# Patient Record
Sex: FEMALE | Race: WHITE | Employment: FULL TIME | URBAN - METROPOLITAN AREA
[De-identification: names, ages, dates, MRNs, and addresses within clinical notes are randomized per-mention and may not be internally consistent; named-entity substitution may affect disease eponyms.]

---

## 2019-09-26 ENCOUNTER — HOSPITAL ENCOUNTER (EMERGENCY)
Age: 24
Discharge: HOME | End: 2019-09-26
Attending: EMERGENCY MEDICINE
Payer: COMMERCIAL

## 2019-09-26 ENCOUNTER — APPOINTMENT (OUTPATIENT)
Dept: GENERAL RADIOLOGY | Age: 24
End: 2019-09-26
Attending: EMERGENCY MEDICINE
Payer: COMMERCIAL

## 2019-09-26 VITALS
HEART RATE: 76 BPM | RESPIRATION RATE: 16 BRPM | HEIGHT: 70 IN | WEIGHT: 134 LBS | OXYGEN SATURATION: 99 % | SYSTOLIC BLOOD PRESSURE: 123 MMHG | TEMPERATURE: 98.5 F | BODY MASS INDEX: 19.18 KG/M2 | DIASTOLIC BLOOD PRESSURE: 80 MMHG

## 2019-09-26 DIAGNOSIS — R51.9 HEADACHE, UNSPECIFIED HEADACHE TYPE: Primary | ICD-10-CM

## 2019-09-26 DIAGNOSIS — J45.20 MILD INTERMITTENT REACTIVE AIRWAY DISEASE WITHOUT COMPLICATION: ICD-10-CM

## 2019-09-26 PROCEDURE — 94640 AIRWAY INHALATION TREATMENT: CPT

## 2019-09-26 PROCEDURE — 74011000250 HC RX REV CODE- 250: Performed by: EMERGENCY MEDICINE

## 2019-09-26 PROCEDURE — 71046 X-RAY EXAM CHEST 2 VIEWS: CPT

## 2019-09-26 PROCEDURE — 99283 EMERGENCY DEPT VISIT LOW MDM: CPT | Performed by: EMERGENCY MEDICINE

## 2019-09-26 RX ORDER — ALBUTEROL SULFATE 90 UG/1
2 AEROSOL, METERED RESPIRATORY (INHALATION)
Qty: 1 INHALER | Refills: 4 | Status: SHIPPED | OUTPATIENT
Start: 2019-09-26

## 2019-09-26 RX ORDER — IPRATROPIUM BROMIDE AND ALBUTEROL SULFATE 2.5; .5 MG/3ML; MG/3ML
3 SOLUTION RESPIRATORY (INHALATION)
Status: COMPLETED | OUTPATIENT
Start: 2019-09-26 | End: 2019-09-26

## 2019-09-26 RX ADMIN — IPRATROPIUM BROMIDE AND ALBUTEROL SULFATE 3 ML: .5; 3 SOLUTION RESPIRATORY (INHALATION) at 18:16

## 2019-09-26 NOTE — ED PROVIDER NOTES
Sierra Brian,#[id number] is a 27 y.o. female seen on 9/26/2019 at 6:01 PM in the [facility] EMERGENCY DEPT in room HD/D. Chief Complaint   Patient presents with    Headache     HPI: 72-year-old female presenting to the emergency department for multiple complaints. Despite patient being able and attempting she states that the patient has to provide history, the mother provides the [de-identified] of the history. History of migraine headaches that occur at least twice a week. She has a headache currently. It is all over her head but she feels like it began in the back of her head. Patient also states she feels like she may have a sinus infection she is been very congested over the last couple of days. However the mom states that the reason she forced the patient to come into the emergency department today is because she has been complaining of wheezing and some shortness of breath and some discomfort in her chest and she has a history of asthma. She does not have any albuterol at home she does have Symbicort which she has been taking. She currently does not feel short of breath she has not had any chest pain the day. She has not had any fevers, she does not have a productive cough. She has had some nausea associated with her headaches, no numbness or tingling, no weakness in extremities, no syncope. The headache was gradual in onset. It feels similar to all of her previous headaches. She is taking Excedrin Migraine as well as a cold medicine for this. Historian: Patient, mother    REVIEW OF SYSTEMS     Review of Systems   Constitutional: Negative for fever. HENT: Positive for congestion. Eyes: Negative. Respiratory: Positive for wheezing. Negative for cough, chest tightness and shortness of breath. Gastrointestinal: Negative for abdominal distention, abdominal pain, constipation, diarrhea and vomiting. Endocrine: Negative.     Genitourinary: Negative for dysuria, flank pain, frequency and urgency. Neurological: Positive for headaches. Negative for dizziness and syncope. Psychiatric/Behavioral: Negative. All other systems reviewed and are negative. PAST MEDICAL HISTORY     Past Medical History:   Diagnosis Date    Neurological disorder     migraine     Past Surgical History:   Procedure Laterality Date    HX ADENOIDECTOMY      HX HEENT      sinus    HX ORTHOPAEDIC      knee surgeries     Social History     Tobacco Use    Smoking status: Never Smoker    Smokeless tobacco: Never Used   Substance and Sexual Activity    Alcohol use: Yes     Comment: occ    Drug use: Yes     Types: Marijuana     Comment: maybe once per month     None     Allergies   Allergen Reactions    Sulfa (Sulfonamide Antibiotics) Anaphylaxis        PHYSICAL EXAM       Vitals:    09/26/19 1649   BP: 122/81   Pulse: 88   Resp: 16   Temp: 98.8 °F (37.1 °C)   SpO2: 98%    Vital signs were reviewed. Physical Exam   Constitutional: She is oriented to person, place, and time. She appears well-developed and well-nourished. No distress. HENT:   Head: Normocephalic and atraumatic. Right Ear: External ear normal.   Left Ear: External ear normal.   Mouth/Throat: No oropharyngeal exudate. Eyes: Pupils are equal, round, and reactive to light. EOM are normal.   Neck: Normal range of motion. Neck supple. No meningismus   Cardiovascular: Normal rate, regular rhythm, normal heart sounds and intact distal pulses. Exam reveals no gallop and no friction rub. No murmur heard. Pulmonary/Chest: Effort normal and breath sounds normal. No stridor. No respiratory distress. She has no wheezes. She has no rales. diminished expiratory breath sounds   Abdominal: Soft. Bowel sounds are normal. She exhibits no distension and no mass. There is no tenderness. There is no rebound and no guarding. Musculoskeletal: Normal range of motion. She exhibits no edema, tenderness or deformity. Lymphadenopathy:     She has no cervical adenopathy. Neurological: She is alert and oriented to person, place, and time. She has normal strength. She displays no atrophy. No cranial nerve deficit or sensory deficit. She displays a negative Romberg sign. Coordination and gait normal. GCS eye subscore is 4. GCS verbal subscore is 5. GCS motor subscore is 6.   5+ strength in all distributions, sensation intact, finger-nose-finger intact, normal gait, normal tandem gait,   Skin: Skin is warm and dry. Capillary refill takes less than 2 seconds. No rash noted. She is not diaphoretic. No erythema. Psychiatric: She has a normal mood and affect. Her behavior is normal.   Vitals reviewed. MEDICAL DECISION MAKING       MDM  Number of Diagnoses or Management Options     Amount and/or Complexity of Data Reviewed  Tests in the radiology section of CPT®: ordered and reviewed    Risk of Complications, Morbidity, and/or Mortality  Presenting problems: high  Diagnostic procedures: high  Management options: high      Procedures    ED Course:  Patient presents to the ER with headache similar to previous episodes. Neurological evaluation is unremarkable and reassuring. Headache was gradual in onset. I have very low suspicion for SAH, dissection, or other concerning cause of headache and do not feel that neuroimaging is indicated at this time. Pt is refusing medication to treat her headache.    6:52 PM  Reevaluation after albuterol shows some improved aeration with expiratory breath sounds. There is no evidence of pneumonia. Will discharge home at this time with referral to neurology given her chronic headaches as well as a refill for albuterol. Disposition: disharge home  Diagnosis:, Unspecified, reactive airway disease  ____________________________________________________________________  A portion of this note was generated using voice recognition dictation software.  While the note has been reviewed for accuracy, please note certain words and phrases may not be transcribed as intended and some grammatical and/or typographical errors may be present.

## 2019-09-26 NOTE — ED NOTES
I have reviewed discharge instructions with the patient. The patient verbalized understanding. Patient left ED via Discharge Method: ambulatory to Home with mother. Opportunity for questions and clarification provided. Patient given 1 scripts. To continue your aftercare when you leave the hospital, you may receive an automated call from our care team to check in on how you are doing. This is a free service and part of our promise to provide the best care and service to meet your aftercare needs.  If you have questions, or wish to unsubscribe from this service please call [phone]. Thank you for Choosing our [facility] Emergency Department.

## 2019-09-26 NOTE — ED TRIAGE NOTES
Pt to triage complaining of headaches off and on for 2 years. States she saw her pediatrician a year or so ago and they put her on preventative medication but it wasn't really helpful. States she's had several falls where she has had contusions on her head. States when she gets the headaches her face swells. States she is also having sinus pressure.

## 2019-09-26 NOTE — DISCHARGE INSTRUCTIONS
Use the inhaler, 2 puffs, every 3-4 hours for the next 4 days, then use as needed. Our neurologist do not take referral from the ER. You will need to establish with a primary care doctor and get a referral to neurology through your primary care doctor.

## 2021-09-30 PROBLEM — L20.9 ATOPIC DERMATITIS: Status: ACTIVE | Noted: 2019-10-14

## 2021-09-30 PROBLEM — T78.2XXA ANAPHYLAXIS DUE TO EXERCISE: Status: ACTIVE | Noted: 2019-10-14
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
## 2021-09-30 PROBLEM — J45.20 INTERMITTENT ASTHMA WITHOUT COMPLICATION: Status: ACTIVE | Noted: 2018-02-21

## 2021-09-30 PROBLEM — J32.9 CHRONIC SINUSITIS: Status: ACTIVE | Noted: 2021-05-06

## 2021-09-30 PROBLEM — G43.109 MIGRAINE WITH AURA AND WITHOUT STATUS MIGRAINOSUS, NOT INTRACTABLE: Status: ACTIVE | Noted: 2020-04-29

## 2022-03-02 ENCOUNTER — ANESTHESIA (OUTPATIENT)
Dept: SURGERY | Age: 27
End: 2022-03-02
Payer: COMMERCIAL

## 2022-03-02 ENCOUNTER — ANESTHESIA EVENT (OUTPATIENT)
Dept: SURGERY | Age: 27
End: 2022-03-02
Payer: COMMERCIAL

## 2022-03-02 ENCOUNTER — HOSPITAL ENCOUNTER (OUTPATIENT)
Age: 27
Setting detail: OUTPATIENT SURGERY
Discharge: HOME | End: 2022-03-02
Attending: OBSTETRICS & GYNECOLOGY | Admitting: OBSTETRICS & GYNECOLOGY
Payer: COMMERCIAL

## 2022-03-02 VITALS
BODY MASS INDEX: 21.62 KG/M2 | SYSTOLIC BLOOD PRESSURE: 117 MMHG | WEIGHT: 146 LBS | TEMPERATURE: 99.5 F | HEIGHT: 69 IN | HEART RATE: 64 BPM | OXYGEN SATURATION: 99 % | DIASTOLIC BLOOD PRESSURE: 58 MMHG | RESPIRATION RATE: 16 BRPM

## 2022-03-02 DIAGNOSIS — N83.201 CYST OF RIGHT OVARY: Primary | ICD-10-CM

## 2022-03-02 LAB — HCG UR QL: NEGATIVE

## 2022-03-02 PROCEDURE — 58661 LAPAROSCOPY REMOVE ADNEXA: CPT | Performed by: OBSTETRICS & GYNECOLOGY

## 2022-03-02 PROCEDURE — 74011000258 HC RX REV CODE- 258: Performed by: ANESTHESIOLOGY

## 2022-03-02 PROCEDURE — 77030040361 HC SLV COMPR DVT MDII -B: Performed by: OBSTETRICS & GYNECOLOGY

## 2022-03-02 PROCEDURE — 74011250636 HC RX REV CODE- 250/636: Performed by: ANESTHESIOLOGY

## 2022-03-02 PROCEDURE — 77030018836 HC SOL IRR NACL ICUM -A: Performed by: OBSTETRICS & GYNECOLOGY

## 2022-03-02 PROCEDURE — 88305 TISSUE EXAM BY PATHOLOGIST: CPT

## 2022-03-02 PROCEDURE — 77030008756 HC TU IRR SUC STRY -B: Performed by: OBSTETRICS & GYNECOLOGY

## 2022-03-02 PROCEDURE — 77030039425 HC BLD LARYNG TRULITE DISP TELE -A: Performed by: ANESTHESIOLOGY

## 2022-03-02 PROCEDURE — 74011000250 HC RX REV CODE- 250: Performed by: ANESTHESIOLOGY

## 2022-03-02 PROCEDURE — 74011250636 HC RX REV CODE- 250/636: Performed by: NURSE ANESTHETIST, CERTIFIED REGISTERED

## 2022-03-02 PROCEDURE — 81025 URINE PREGNANCY TEST: CPT

## 2022-03-02 PROCEDURE — 77030031139 HC SUT VCRL2 J&J -A: Performed by: OBSTETRICS & GYNECOLOGY

## 2022-03-02 PROCEDURE — 77030037088 HC TUBE ENDOTRACH ORAL NSL COVD-A: Performed by: ANESTHESIOLOGY

## 2022-03-02 PROCEDURE — 76210000026 HC REC RM PH II 1 TO 1.5 HR: Performed by: OBSTETRICS & GYNECOLOGY

## 2022-03-02 PROCEDURE — 74011000250 HC RX REV CODE- 250: Performed by: OBSTETRICS & GYNECOLOGY

## 2022-03-02 PROCEDURE — 77030019908 HC STETH ESOPH SIMS -A: Performed by: ANESTHESIOLOGY

## 2022-03-02 PROCEDURE — 76210000006 HC OR PH I REC 0.5 TO 1 HR: Performed by: OBSTETRICS & GYNECOLOGY

## 2022-03-02 PROCEDURE — 77030041236 HC APPL SURG ENDO JNJ -B: Performed by: OBSTETRICS & GYNECOLOGY

## 2022-03-02 PROCEDURE — 2709999900 HC NON-CHARGEABLE SUPPLY: Performed by: OBSTETRICS & GYNECOLOGY

## 2022-03-02 PROCEDURE — 74011250637 HC RX REV CODE- 250/637: Performed by: ANESTHESIOLOGY

## 2022-03-02 PROCEDURE — 77030012770 HC TRCR OPT FX AMR -B: Performed by: OBSTETRICS & GYNECOLOGY

## 2022-03-02 PROCEDURE — 74011000250 HC RX REV CODE- 250: Performed by: NURSE ANESTHETIST, CERTIFIED REGISTERED

## 2022-03-02 PROCEDURE — 77030020829: Performed by: OBSTETRICS & GYNECOLOGY

## 2022-03-02 PROCEDURE — 77030040830 HC CATH URETH FOL MDII -A: Performed by: OBSTETRICS & GYNECOLOGY

## 2022-03-02 PROCEDURE — 77030003578 HC NDL INSUF VERES AMR -B: Performed by: OBSTETRICS & GYNECOLOGY

## 2022-03-02 PROCEDURE — 77030008522 HC TBNG INSUF LAPRO STRY -B: Performed by: OBSTETRICS & GYNECOLOGY

## 2022-03-02 PROCEDURE — 77030008606 HC TRCR ENDOSC KII AMR -B: Performed by: OBSTETRICS & GYNECOLOGY

## 2022-03-02 PROCEDURE — 77030040922 HC BLNKT HYPOTHRM STRY -A: Performed by: ANESTHESIOLOGY

## 2022-03-02 PROCEDURE — 76010000162 HC OR TIME 1.5 TO 2 HR INTENSV-TIER 1: Performed by: OBSTETRICS & GYNECOLOGY

## 2022-03-02 PROCEDURE — 77030039147 HC PWDR HEMSTS SURGICEL JNJ -D: Performed by: OBSTETRICS & GYNECOLOGY

## 2022-03-02 PROCEDURE — 77030010507 HC ADH SKN DERMBND J&J -B: Performed by: OBSTETRICS & GYNECOLOGY

## 2022-03-02 PROCEDURE — 76060000034 HC ANESTHESIA 1.5 TO 2 HR: Performed by: OBSTETRICS & GYNECOLOGY

## 2022-03-02 RX ORDER — OXYCODONE AND ACETAMINOPHEN 5; 325 MG/1; MG/1
1 TABLET ORAL
Qty: 12 TABLET | Refills: 0 | Status: SHIPPED | OUTPATIENT
Start: 2022-03-02 | End: 2022-03-05

## 2022-03-02 RX ORDER — GLYCOPYRROLATE 0.2 MG/ML
INJECTION INTRAMUSCULAR; INTRAVENOUS AS NEEDED
Status: DISCONTINUED | OUTPATIENT
Start: 2022-03-02 | End: 2022-03-02 | Stop reason: HOSPADM

## 2022-03-02 RX ORDER — LIDOCAINE HYDROCHLORIDE 20 MG/ML
INJECTION, SOLUTION EPIDURAL; INFILTRATION; INTRACAUDAL; PERINEURAL AS NEEDED
Status: DISCONTINUED | OUTPATIENT
Start: 2022-03-02 | End: 2022-03-02 | Stop reason: HOSPADM

## 2022-03-02 RX ORDER — SODIUM CHLORIDE 0.9 % (FLUSH) 0.9 %
5-40 SYRINGE (ML) INJECTION AS NEEDED
Status: DISCONTINUED | OUTPATIENT
Start: 2022-03-02 | End: 2022-03-02 | Stop reason: HOSPADM

## 2022-03-02 RX ORDER — ROCURONIUM BROMIDE 10 MG/ML
INJECTION, SOLUTION INTRAVENOUS AS NEEDED
Status: DISCONTINUED | OUTPATIENT
Start: 2022-03-02 | End: 2022-03-02 | Stop reason: HOSPADM

## 2022-03-02 RX ORDER — MIDAZOLAM HYDROCHLORIDE 1 MG/ML
2 INJECTION, SOLUTION INTRAMUSCULAR; INTRAVENOUS
Status: COMPLETED | OUTPATIENT
Start: 2022-03-02 | End: 2022-03-02

## 2022-03-02 RX ORDER — OXYCODONE AND ACETAMINOPHEN 5; 325 MG/1; MG/1
1 TABLET ORAL AS NEEDED
Status: DISCONTINUED | OUTPATIENT
Start: 2022-03-02 | End: 2022-03-02 | Stop reason: HOSPADM

## 2022-03-02 RX ORDER — NALOXONE HYDROCHLORIDE 0.4 MG/ML
0.2 INJECTION, SOLUTION INTRAMUSCULAR; INTRAVENOUS; SUBCUTANEOUS AS NEEDED
Status: DISCONTINUED | OUTPATIENT
Start: 2022-03-02 | End: 2022-03-02 | Stop reason: HOSPADM

## 2022-03-02 RX ORDER — SODIUM CHLORIDE, SODIUM LACTATE, POTASSIUM CHLORIDE, CALCIUM CHLORIDE 600; 310; 30; 20 MG/100ML; MG/100ML; MG/100ML; MG/100ML
75 INJECTION, SOLUTION INTRAVENOUS CONTINUOUS
Status: DISCONTINUED | OUTPATIENT
Start: 2022-03-02 | End: 2022-03-02 | Stop reason: HOSPADM

## 2022-03-02 RX ORDER — HYDROMORPHONE HYDROCHLORIDE 2 MG/ML
0.5 INJECTION, SOLUTION INTRAMUSCULAR; INTRAVENOUS; SUBCUTANEOUS
Status: DISCONTINUED | OUTPATIENT
Start: 2022-03-02 | End: 2022-03-02 | Stop reason: HOSPADM

## 2022-03-02 RX ORDER — BUPIVACAINE HYDROCHLORIDE 5 MG/ML
INJECTION, SOLUTION EPIDURAL; INTRACAUDAL AS NEEDED
Status: DISCONTINUED | OUTPATIENT
Start: 2022-03-02 | End: 2022-03-02 | Stop reason: HOSPADM

## 2022-03-02 RX ORDER — ONDANSETRON 2 MG/ML
INJECTION INTRAMUSCULAR; INTRAVENOUS AS NEEDED
Status: DISCONTINUED | OUTPATIENT
Start: 2022-03-02 | End: 2022-03-02 | Stop reason: HOSPADM

## 2022-03-02 RX ORDER — PROMETHAZINE HYDROCHLORIDE 25 MG/1
25 TABLET ORAL
Qty: 15 TABLET | Refills: 0 | Status: SHIPPED | OUTPATIENT
Start: 2022-03-02

## 2022-03-02 RX ORDER — PROPOFOL 10 MG/ML
INJECTION, EMULSION INTRAVENOUS AS NEEDED
Status: DISCONTINUED | OUTPATIENT
Start: 2022-03-02 | End: 2022-03-02 | Stop reason: HOSPADM

## 2022-03-02 RX ORDER — LIDOCAINE HYDROCHLORIDE 10 MG/ML
0.1 INJECTION INFILTRATION; PERINEURAL AS NEEDED
Status: DISCONTINUED | OUTPATIENT
Start: 2022-03-02 | End: 2022-03-02 | Stop reason: HOSPADM

## 2022-03-02 RX ORDER — DEXAMETHASONE SODIUM PHOSPHATE 4 MG/ML
INJECTION, SOLUTION INTRA-ARTICULAR; INTRALESIONAL; INTRAMUSCULAR; INTRAVENOUS; SOFT TISSUE AS NEEDED
Status: DISCONTINUED | OUTPATIENT
Start: 2022-03-02 | End: 2022-03-02 | Stop reason: HOSPADM

## 2022-03-02 RX ORDER — NEOSTIGMINE METHYLSULFATE 1 MG/ML
INJECTION, SOLUTION INTRAVENOUS AS NEEDED
Status: DISCONTINUED | OUTPATIENT
Start: 2022-03-02 | End: 2022-03-02 | Stop reason: HOSPADM

## 2022-03-02 RX ORDER — ONDANSETRON 2 MG/ML
4 INJECTION INTRAMUSCULAR; INTRAVENOUS ONCE
Status: COMPLETED | OUTPATIENT
Start: 2022-03-02 | End: 2022-03-02

## 2022-03-02 RX ORDER — SODIUM CHLORIDE 0.9 % (FLUSH) 0.9 %
5-40 SYRINGE (ML) INJECTION EVERY 8 HOURS
Status: DISCONTINUED | OUTPATIENT
Start: 2022-03-02 | End: 2022-03-02 | Stop reason: HOSPADM

## 2022-03-02 RX ORDER — SCOLOPAMINE TRANSDERMAL SYSTEM 1 MG/1
1 PATCH, EXTENDED RELEASE TRANSDERMAL ONCE
Status: DISCONTINUED | OUTPATIENT
Start: 2022-03-02 | End: 2022-03-02 | Stop reason: HOSPADM

## 2022-03-02 RX ORDER — APREPITANT 40 MG/1
40 CAPSULE ORAL ONCE
Status: COMPLETED | OUTPATIENT
Start: 2022-03-02 | End: 2022-03-02

## 2022-03-02 RX ORDER — FENTANYL CITRATE 50 UG/ML
INJECTION, SOLUTION INTRAMUSCULAR; INTRAVENOUS AS NEEDED
Status: DISCONTINUED | OUTPATIENT
Start: 2022-03-02 | End: 2022-03-02 | Stop reason: HOSPADM

## 2022-03-02 RX ORDER — ONDANSETRON 2 MG/ML
INJECTION INTRAMUSCULAR; INTRAVENOUS
Status: DISCONTINUED
Start: 2022-03-02 | End: 2022-03-02 | Stop reason: HOSPADM

## 2022-03-02 RX ORDER — CEFAZOLIN SODIUM/WATER 2 G/20 ML
SYRINGE (ML) INTRAVENOUS AS NEEDED
Status: DISCONTINUED | OUTPATIENT
Start: 2022-03-02 | End: 2022-03-02 | Stop reason: HOSPADM

## 2022-03-02 RX ORDER — HYDROMORPHONE HYDROCHLORIDE 2 MG/ML
INJECTION, SOLUTION INTRAMUSCULAR; INTRAVENOUS; SUBCUTANEOUS AS NEEDED
Status: DISCONTINUED | OUTPATIENT
Start: 2022-03-02 | End: 2022-03-02 | Stop reason: HOSPADM

## 2022-03-02 RX ADMIN — LIDOCAINE HYDROCHLORIDE 0.1 ML: 10 INJECTION, SOLUTION INFILTRATION; PERINEURAL at 14:00

## 2022-03-02 RX ADMIN — GLYCOPYRROLATE 0.4 MG: 0.2 INJECTION, SOLUTION INTRAMUSCULAR; INTRAVENOUS at 15:46

## 2022-03-02 RX ADMIN — PROMETHAZINE HYDROCHLORIDE 6.25 MG: 25 INJECTION INTRAMUSCULAR; INTRAVENOUS at 16:35

## 2022-03-02 RX ADMIN — FENTANYL CITRATE 100 MCG: 50 INJECTION INTRAMUSCULAR; INTRAVENOUS at 14:17

## 2022-03-02 RX ADMIN — LIDOCAINE HYDROCHLORIDE 80 MG: 20 INJECTION, SOLUTION EPIDURAL; INFILTRATION; INTRACAUDAL; PERINEURAL at 14:17

## 2022-03-02 RX ADMIN — HYDROMORPHONE HYDROCHLORIDE 0.4 MG: 2 INJECTION INTRAMUSCULAR; INTRAVENOUS; SUBCUTANEOUS at 15:17

## 2022-03-02 RX ADMIN — HYDROMORPHONE HYDROCHLORIDE 0.4 MG: 2 INJECTION INTRAMUSCULAR; INTRAVENOUS; SUBCUTANEOUS at 14:55

## 2022-03-02 RX ADMIN — PROPOFOL 200 MG: 10 INJECTION, EMULSION INTRAVENOUS at 14:17

## 2022-03-02 RX ADMIN — ROCURONIUM BROMIDE 40 MG: 50 INJECTION, SOLUTION INTRAVENOUS at 14:17

## 2022-03-02 RX ADMIN — OXYCODONE HYDROCHLORIDE AND ACETAMINOPHEN 1 TABLET: 5; 325 TABLET ORAL at 16:48

## 2022-03-02 RX ADMIN — MIDAZOLAM 2 MG: 1 INJECTION INTRAMUSCULAR; INTRAVENOUS at 14:05

## 2022-03-02 RX ADMIN — APREPITANT 40 MG: 40 CAPSULE ORAL at 14:04

## 2022-03-02 RX ADMIN — SODIUM CHLORIDE, SODIUM LACTATE, POTASSIUM CHLORIDE, AND CALCIUM CHLORIDE 75 ML/HR: 600; 310; 30; 20 INJECTION, SOLUTION INTRAVENOUS at 14:00

## 2022-03-02 RX ADMIN — DEXAMETHASONE SODIUM PHOSPHATE 10 MG: 4 INJECTION, SOLUTION INTRAMUSCULAR; INTRAVENOUS at 14:28

## 2022-03-02 RX ADMIN — Medication 3 MG: at 15:46

## 2022-03-02 RX ADMIN — Medication 2 G: at 14:42

## 2022-03-02 RX ADMIN — ONDANSETRON 4 MG: 2 INJECTION INTRAMUSCULAR; INTRAVENOUS at 14:28

## 2022-03-02 RX ADMIN — SODIUM CHLORIDE, SODIUM LACTATE, POTASSIUM CHLORIDE, AND CALCIUM CHLORIDE: 600; 310; 30; 20 INJECTION, SOLUTION INTRAVENOUS at 15:49

## 2022-03-02 RX ADMIN — ONDANSETRON 4 MG: 2 INJECTION INTRAMUSCULAR; INTRAVENOUS at 16:17

## 2022-03-02 NOTE — PERIOP NOTES
[address] March 2, 2022       RE: Abisai [address]ins      To Whom It May Concern,    This is to certify that [address] may may return to work on March 9th, 2022. Please feel free to contact my office if you have any questions or concerns. Thank you for your assistance in this matter.       Sincerely,    Dr. Chalo Noel

## 2022-03-02 NOTE — H&P
Subjective:     [address], MRN: [id number], is a 27 y.o.  female presents with pelvic pain since last night, seen by Dr Dhruv Stover this morning, U/S confirming a ruptured right ovarian hemorrhagic cyst. Patient states pain is excruciating and wishes to have surgery for drainage of cyst/blood. . gradually worsening course. See office notes on  Care.  She is NPO since last night    Patient Active Problem List    Diagnosis Date Noted    Chronic sinusitis 05/06/2021    Migraine with aura and without status migrainosus, not intractable 04/29/2020    Atopic dermatitis 10/14/2019    Anaphylaxis due to exercise 10/14/2019    Intermittent asthma without complication 28/84/8329     Past Medical History:   Diagnosis Date    Allergy     DVT of axillary vein, acute left ([address]     2014-after knee sugery    Neurological disorder     migraine      Past Surgical History:   Procedure Laterality Date    HX ADENOIDECTOMY      HX HEENT      sinus    HX ORTHOPAEDIC      knee surgeries      [unfilled]  Allergies   Allergen Reactions    Acetazolamide Rash    Cherry Anaphylaxis     Cherry     Sulfa (Sulfonamide Antibiotics) Anaphylaxis    Nickel Rash      Social History     Tobacco Use    Smoking status: Never Smoker    Smokeless tobacco: Never Used   Substance Use Topics    Alcohol use: Yes     Comment: occ      Family History   Problem Relation Age of Onset   Pedrito Ramos Arthritis-rheumatoid Mother     Breast Cancer Neg Hx     Colon Cancer Neg Hx         Review of Systems  Constitutional: negative  Respiratory: negative  Cardiovascular: negative  Musculoskeletal:negative    Objective:     Patient Vitals for the past 8 hrs:   BP Temp Pulse Resp SpO2 Height Weight   03/02/22 1331 130/81 98.1 °F (36.7 °C) 68 16 98 % 5' 8.5\" (1.74 m) 146 lb (66.2 kg)     No intake or output data in the 24 hours ending 03/02/22 1358  Visit Vitals  /81 (BP 1 Location: Right upper arm, BP Patient Position: At rest;Sitting)   Pulse 68 Temp 98.1 °F (36.7 °C)   Resp 16   Ht 5' 8.5\" (1.74 m)   Wt 146 lb (66.2 kg)   SpO2 98%   BMI 21.88 kg/m²     General appearance: alert, cooperative, no distress, appears stated age  Head: Normocephalic, without obvious abnormality, atraumatic  Back: symmetric, no curvature. ROM normal. No CVA tenderness. Lungs: clear to auscultation bilaterally  Heart: regular rate and rhythm, S1, S2 normal, no murmur, click, rub or gallop  Abdomen: soft, but tender. Bowel sounds normal. No masses,  no organomegaly  Pelvic: defferred  Extremities: extremities normal, atraumatic, no cyanosis or edema  Pulses: 2+ and symmetric  Skin: Skin color, texture, turgor normal. No rashes or lesions      Assessment:         Ruptured right ovarian hemorrhagic cyst causing unbearable pain. Patient wishes to have laparoscopic drainage asap. Discussed risks of infection, DVT, damage to bowel/bladder/other internal organs, bleeding/transfusion, scar tissue/adhesions. Also possible removal of right ovary if bleeding cannot be stopped. All questions answered, will proceed.       Plan:     Diagnostic laparoscopy with drainage of hemoperitoneum and right ovarian cyst under general anesthesia

## 2022-03-02 NOTE — ANESTHESIA POSTPROCEDURE EVALUATION
Procedure(s):  RIGHT OVARIAN CYSTECTOMY.     general    Anesthesia Post Evaluation      Multimodal analgesia: multimodal analgesia used between 6 hours prior to anesthesia start to PACU discharge  Patient location during evaluation: bedside  Patient participation: complete - patient participated  Level of consciousness: responsive to verbal stimuli  Pain management: adequate  Airway patency: patent  Anesthetic complications: no  Cardiovascular status: hemodynamically stable  Respiratory status: spontaneous ventilation  Hydration status: stable    Final Post Anesthesia Temperature Assessment:  Normothermia (36.0-37.5 degrees C)      INITIAL Post-op Vital signs:   Vitals Value Taken Time   /55 03/02/22 1610   Temp 37.5 °C (99.5 °F) 03/02/22 1606   Pulse 61 03/02/22 1610   Resp 16 03/02/22 1610   SpO2 100 % 03/02/22 1610

## 2022-03-02 NOTE — PERIOP NOTES
Pt dc'd in stable condition via wheelchair to home with family. All discharge instructions discussed with patient and family; verbalized understanding. Pt and family notified of pain meds/next time to be taken. No percocet prior to 2100 tonight.

## 2022-03-02 NOTE — ANESTHESIA PREPROCEDURE EVALUATION
Relevant Problems   RESPIRATORY SYSTEM   (+) Intermittent asthma without complication      NEUROLOGY   (+) Migraine with aura and without status migrainosus, not intractable       Anesthetic History   No history of anesthetic complications            Review of Systems / Medical History  Patient summary reviewed and pertinent labs reviewed    Pulmonary            Asthma : well controlled       Neuro/Psych   Within defined limits           Cardiovascular                  Exercise tolerance: >4 METS     GI/Hepatic/Renal  Within defined limits              Endo/Other  Within defined limits           Other Findings              Physical Exam    Airway  Mallampati: I  TM Distance: 4 - 6 cm  Neck ROM: normal range of motion   Mouth opening: Normal     Cardiovascular    Rhythm: regular  Rate: normal         Dental  No notable dental hx       Pulmonary  Breath sounds clear to auscultation               Abdominal  GI exam deferred       Other Findings            Anesthetic Plan    ASA: 2, emergent  Anesthesia type: general          Induction: Intravenous  Anesthetic plan and risks discussed with: Patient

## 2022-03-02 NOTE — PERIOP NOTES
[address] [facility] Phone Call (performed day before scheduled surgery):    1. Have you have any exposure to anyone who has tested positive for COVID19 in the last 7 days? Patient Response: no      2.    Have you experienced any of the below symptoms in the last 48 hours?      -New onset respiratory symptoms                  -Fever or chills                  -Cough / Congestion / running nose                  -Shortness of breath or difficulty breathing                  -Headache                 -Sore Throat                 -New loss or taste or smell                 -Nausea / Vomiting / Diarrhea           Patient Response: no    Comments:

## 2022-03-02 NOTE — BRIEF OP NOTE
Brief Postoperative Note    Patient: [address]  YOB: 1995  MRN: [id number]    Date of Procedure: 3/2/2022     Pre-Op Diagnosis: RUPTURED Right OVARIAN CYST    Post-Op Diagnosis: Same as preoperative diagnosis.  RIGHT      Procedure(s):  RIGHT OVARIAN CYSTECTOMY    Surgeon(s):  Ibrahima Saldivar MD    Surgical Assistant: None    Anesthesia: General     Estimated Blood Loss (mL): less than 50     Complications: Bleeding    Specimens:   ID Type Source Tests Collected by Time Destination   1 : Right Ovarian Cyst Wall Preservative   Ibrahima Saldivar MD 3/2/2022 1500 Pathology        Implants: * No implants in log *    Drains: * No LDAs found *    Findings: large right ruptured ovarian cyst with removal of cyst wall; normal uterus, right tube and left tube/ovary    Electronically Signed by Jasper Taylor MD on 3/2/2022 at 5:45 PM

## 2022-03-02 NOTE — DISCHARGE INSTRUCTIONS
No Percocet prior to 10pm tonight! May return to work on March 9th, 2022        INSTRUCTIONS FOLLOWING GYN LAPAROSCOPY      ACTIVITY   Limit activity today; increase activity tomorrow, but no vigorous exercise   Shower only; no tub baths   No douches, tampons or intercourse until your doctor releases you (at least 2 weeks)   May return to work or school as directed by your doctor    DIET   Clear liquids until no nausea or vomiting   Advance to regular diet as tolerated    PAIN   Expect a moderate amount of pain.  Take pain medication as directed by your doctor. If no prescription for pain is sent home with you, take the appropriate dose of your commonly used pain medication.  Call you doctor if pain is NOT relieved by medication.  DO NOT take aspirin or blood thinners until directed by your doctor. DRESSING CARE *** Does Not Apply   Change dressing / band aids as directed by your doctor. FOLLOW PHONE [address] will be made by nursing staff.  If you have any problems or concerns, call your doctor as needed. CALL YOUR DOCTOR IF   Excessive bleeding that does not stop after holding mild pressure over the area for 15 minutes   You soak a pad in an hour   Temperature of 101°F or above   Green or yellow, smelly drainage or discharge   You are unable to urinate by bedtime   Nausea and vomiting that does not stop by bedtime    MEDICATION INTERACTION:  During your procedure you potentially received a medication or medications which may reduce the effectiveness of oral contraceptives. Please consider other forms of contraception for 1 month following your procedure if you are currently using oral contraceptives as your primary form of birth control.  In addition to this, we recommend continuing your oral contraceptive as prescribed, unless otherwise instructed by your physician, during this time    After general anesthesia or intravenous sedation, for 24 hours or while taking prescription Narcotics:  · Limit your activities  · A responsible adult needs to be with you for the next 24 hours  · Do not drive and operate hazardous machinery  · Do not make important personal or business decisions  · Do not drink alcoholic beverages  · If you have not urinated within 8 hours after discharge, and you are experiencing discomfort from urinary retention, please go to the nearest ED. · If you have sleep apnea and have a CPAP machine, please use it for all naps and sleeping. · Please use caution when taking narcotics and any of your home medications that may cause drowsiness. *  Please give a list of your current medications to your Primary Care Provider. *  Please update this list whenever your medications are discontinued, doses are      changed, or new medications (including over-the-counter products) are added. *  Please carry medication information at all times in case of emergency situations. These are general instructions for a healthy lifestyle:  No smoking/ No tobacco products/ Avoid exposure to second hand smoke  Surgeon General's Warning:  Quitting smoking now greatly reduces serious risk to your health. Obesity, smoking, and sedentary lifestyle greatly increases your risk for illness  A healthy diet, regular physical exercise & weight monitoring are important for maintaining a healthy lifestyle    You may be retaining fluid if you have a history of heart failure or if you experience any of the following symptoms:  Weight gain of 3 pounds or more overnight or 5 pounds in a week, increased swelling in our hands or feet or shortness of breath while lying flat in bed. Please call your doctor as soon as you notice any of these symptoms; do not wait until your next office visit.

## 2022-03-09 NOTE — OP NOTES
[id number] [address]  OPERATIVE REPORT    Name:  Quan Salmon  MR#:  [id number]  :  1995  ACCOUNT #:  [de-identified]  DATE OF SERVICE:  2022    PREOPERATIVE DIAGNOSIS:  Acute rupture of a large right ovarian hemorrhagic cyst.    POSTOPERATIVE DIAGNOSIS:  Acute rupture of a large right ovarian hemorrhagic cyst.    PROCEDURE PERFORMED:  1. Diagnostic laparoscopy. 2.  Right ovarian cystectomy. 3.  Evacuation of pelvic hematoma. SURGEON:  Boone Carter MD    ASSISTANT:  none    ANESTHESIA:  General.    COMPLICATIONS:  none. SPECIMENS REMOVED:  Right ovarian cyst wall. IMPLANTS:  none. ESTIMATED BLOOD LOSS:  Less than 20 mL from surgery (approximately 200 to 250 mL of pre-existing blood loss, prior to surgery). DRAINS:  In-and-out catheterization. FINDINGS:  The patient had a very large ruptured right ovarian cyst with pre-existing blood and blood also still present in the cyst.  The right fallopian tube was normal as well as the uterus. Left ovary and left fallopian tube, the remainder of the pelvis and upper abdomen were normal.    PROCEDURE:  After informed consent, the patient was taken to the operating room, she was prepped and draped in the usual sterile fashion in the dorsal lithotomy position. I did not place anything in the uterus due to presence of IUD. The time-out was accomplished, and then laparoscopy was performed by infiltrating dilute Marcaine into the infraumbilical area. A #11 blade was used to make a 5 mm incision; through this, the Veress needle was passed into abdominal cavity and 3 liters of CO2 was infiltrated. A 5 mm disposable trocar was passed through this incision without difficulty, this went into the abdomen and pelvis, and the patient was placed in Trendelenburg. The laparoscope was now placed through the sleeve. We could easily see a lot of blood in the pelvis.   I continued on placing a 5 mm port in the right lower quadrant and a 5 mm port in the left lower quadrant under direct visualization. Through these two lateral ports, suction device as well as grasping device were passed back and forth. The blood was now evacuated out of the pelvis so that I could see the anatomy. The ovary was now grasped and pulled away from the uterus and the sidewall. Again, right fallopian tube was normal but there was an obvious left ovarian hemorrhagic cyst which was still bleeding somewhat. The two grasping devices were now placed along the opening, further opening the ovary to irrigate and then suck out the blood to see the cyst wall itself. The cyst wall was now tediously pulled away from the ovarian tissue proper. This was done in a piecemeal fashion but all of the tissue was removed, the cyst wall was later submitted for pathologic study. This did help the bleeding somewhat except in the inferior aspect of the laceration, there was still some bleeding. This was irrigated, so that we could find the actual focus of bleeding. This took a few minutes to be able to irrigate this properly and get a [place] device which could cauterize. This was now cauterized at the inferior aspect of the laceration, trying not to disturb normal ovary tissue. This was cauterized and irrigated again, and there was no further bleeding from the ovary and all the cyst wall had been removed. There was, however, a wall base that was present, so we then placed Surgicel powder on this area. Once the observation time went by, I then copiously irrigated this area and removed all the blood from the upper abdomen and pelvis as well. Final inspection revealed good hemostasis, there was normal bleeding and pictures were taken at the end of the case. The case was now terminated as the CO2 was allowed to escape and all of the ports were removed under direct visualization. The ports were now closed with 4-0 Vicryl subcuticular and then Dermabond over this.   The counts were correct x2, the patient was awakened and taken to the recovery room in stable condition. She was given pain pills and precautions on discharge.       Jamaal Fernandes MD      [id number]  D:  03/08/2022 17:08  T:  03/09/2022 4:08  JOB #:  [id number]

## 2022-03-18 PROBLEM — L20.9 ATOPIC DERMATITIS: Status: ACTIVE | Noted: 2019-10-14

## 2022-03-19 PROBLEM — T78.2XXA ANAPHYLAXIS DUE TO EXERCISE: Status: ACTIVE | Noted: 2019-10-14

## 2022-03-19 PROBLEM — J45.20 INTERMITTENT ASTHMA WITHOUT COMPLICATION: Status: ACTIVE | Noted: 2018-02-21

## 2022-03-19 PROBLEM — G43.109 MIGRAINE WITH AURA AND WITHOUT STATUS MIGRAINOSUS, NOT INTRACTABLE: Status: ACTIVE | Noted: 2020-04-29

## 2022-03-19 PROBLEM — J32.9 CHRONIC SINUSITIS: Status: ACTIVE | Noted: 2021-05-06
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
## 2022-06-09 ENCOUNTER — OFFICE VISIT (OUTPATIENT)
Dept: GYNECOLOGY | Age: 27
End: 2022-06-09
Payer: COMMERCIAL

## 2022-06-09 VITALS — SYSTOLIC BLOOD PRESSURE: 108 MMHG | DIASTOLIC BLOOD PRESSURE: 78 MMHG

## 2022-06-09 DIAGNOSIS — N93.9 ABNORMAL UTERINE BLEEDING (AUB): Primary | ICD-10-CM

## 2022-06-09 DIAGNOSIS — B37.9 YEAST INFECTION: ICD-10-CM

## 2022-06-09 DIAGNOSIS — N89.8 VAGINAL IRRITATION: ICD-10-CM

## 2022-06-09 LAB — WET PREP (POC): ABNORMAL

## 2022-06-09 PROCEDURE — 99214 OFFICE O/P EST MOD 30 MIN: CPT | Performed by: OBSTETRICS & GYNECOLOGY

## 2022-06-09 PROCEDURE — 76830 TRANSVAGINAL US NON-OB: CPT | Performed by: OBSTETRICS & GYNECOLOGY

## 2022-06-09 PROCEDURE — 87210 SMEAR WET MOUNT SALINE/INK: CPT | Performed by: OBSTETRICS & GYNECOLOGY

## 2022-06-09 RX ORDER — FLUCONAZOLE 150 MG/1
TABLET ORAL
Qty: 2 TABLET | Refills: 1 | Status: SHIPPED | OUTPATIENT
Start: 2022-06-09 | End: 2022-10-19 | Stop reason: SDUPTHER

## 2022-06-09 RX ORDER — MOMETASONE FUROATE 50 UG/1
SPRAY, METERED NASAL
COMMUNITY
Start: 2022-05-29

## 2022-06-09 RX ORDER — PREDNISONE 20 MG/1
TABLET ORAL
COMMUNITY
Start: 2022-06-07

## 2022-06-09 RX ORDER — AMOXICILLIN AND CLAVULANATE POTASSIUM 875; 125 MG/1; MG/1
TABLET, FILM COATED ORAL
COMMUNITY
Start: 2022-06-07

## 2022-06-09 RX ORDER — OMEPRAZOLE 40 MG/1
CAPSULE, DELAYED RELEASE ORAL
COMMUNITY
Start: 2022-06-01

## 2022-06-09 RX ORDER — BUDESONIDE 0.5 MG/2ML
INHALANT ORAL
COMMUNITY
Start: 2021-07-22

## 2022-06-09 NOTE — PROGRESS NOTES
;HPI  Chidi Ammy is a 27 y.o. female seen for AUB and vaginal discharge with itching and burning. She has an IUD and has not had a period since the IUD was placed until several days ago she had bright red bleeding that has now turned into dark blood. She has discharge with itching and burning. She has been on several different abs and steroids for a sinus infection. Past Medical History, Past Surgical History, Family history, Social History, and Medications were all reviewed with the patient today and updated as necessary. Current Outpatient Medications   Medication Sig    amoxicillin-clavulanate (AUGMENTIN) 875-125 MG per tablet     predniSONE (DELTASONE) 20 MG tablet 2 tabs daily    omeprazole (PRILOSEC) 40 MG delayed release capsule     mometasone (NASONEX) 50 MCG/ACT nasal spray SPRAY 2 SPRAYS INTO EACH NOSTRIL EVERY DAY    budesonide (PULMICORT) 0.5 MG/2ML nebulizer suspension Indications: ok to substitue. to include rinse bottle. Add 10mL of medication to 240mL of saline. Irrigate 2x daily. Budesonide 0.6mg/10 mL    fluconazole (DIFLUCAN) 150 MG tablet 1 tablet now and repeat dose in 4 days    nystatin-triamcinolone (MYCOLOG II) 675975-1.1 UNIT/GM-% cream Apply topically 3 times daily.  terconazole (TERAZOL 7) 0.4 % vaginal cream Place vaginally nightly.  albuterol sulfate  (90 Base) MCG/ACT inhaler Inhale 2 puffs into the lungs every 4 hours as needed    EPINEPHrine (EPIPEN) 0.3 MG/0.3ML SOAJ injection INJECT INTO OUTER THIGH FOR SEVERE ALLERGIC REACTION. CALL 911 AFTER USE.  fexofenadine (ALLEGRA) 180 MG tablet Take 180 mg by mouth daily    fluconazole (DIFLUCAN) 150 MG tablet Take 1 initially and repeat in 4 days. (Patient not taking: Reported on 6/9/2022)     No current facility-administered medications for this visit.      Allergies   Allergen Reactions    Acetazolamide Rash    Cherry Anaphylaxis     Cherry     Sulfa Antibiotics Anaphylaxis    Nickel Rash Past Medical History:   Diagnosis Date    Allergy     DVT of axillary vein, acute left ([address]     2014-after knee sugery    Neurological disorder     migraine    URI (upper respiratory infection)      Past Surgical History:   Procedure Laterality Date    ADENOIDECTOMY      GYN  2022    Diagnostic laparoscopy.; RIGHT OVARIAN CYST WALL\":     HEENT      sinus    ORTHOPEDIC SURGERY      knee surgeries     Family History   Problem Relation Age of Onset    Colon Cancer Neg Hx     Breast Cancer Neg Hx     Rheum Arthritis Mother       Social History     Tobacco Use    Smoking status: Never Smoker    Smokeless tobacco: Never Used   Substance Use Topics    Alcohol use: Yes     Comment: occ       Social History     Substance and Sexual Activity   Sexual Activity Yes    Birth control/protection: I.U.D. Comment: 10/2021 Luca     OB History    Para Term  AB Living   2 0 0 0 2 0   SAB IAB Ectopic Molar Multiple Live Births   0 0 0 0 0 0       Health Maintenance  Mammogram:   Colonoscopy:   Bone Density:  Pap Smear 10-8-21    ROS:    Review of Systems  General: Not Present- Chills, Fever, Fatigue, Insomnia, Hot flashes/Night sweats, Weight gain  Skin: Not Present- Bruising, Change in Wart/Mole, Excessive Sweating, Itching, Nail Changes, New Lesions, Rash, Skin Color Changes and Ulcer. HEENT: Not Present- Headache, Blurred Vision, Double Vision, Glaucoma, Visual Disturbances, Hearing Loss, Ringing in the Ears, Vertigo, Nose Bleed, Bleeding Gums, Hoarseness and Sore Throat. Neck: Not Present- Neck Pain and Neck Swelling. Respiratory: Not Present- Cough, Difficulty Breathing and Difficulty Breathing on Exertion. Breast: Not Present- Breast Mass, Breast Pain, Breast Swelling, Nipple Discharge, Nipple Pain, Recent Breast Size Changes and Skin Changes.   Cardiovascular: Not Present- Abnormal Blood Pressure, Chest Pain, Edema, Fainting / Blacking Out, Palpitations, Shortness of Breath and Swelling of Extremities. Gastrointestinal: Not Present- Abdominal Pain, Abdominal Swelling, Bloating, Change in Bowel Habits, Constipation, Diarrhea, Difficulty Swallowing, Gets full quickly at meals, Nausea, Rectal Bleeding and Vomiting. Female Genitourinary: Not Present- Dysmenorrhea, Dyspareunia, Decreased libido, Excessive Menstrual Bleeding, Menstrual Irregularities, Pelvic Pain, Urinary Complaints, Vaginal Discharge, Vaginal itching/burning, Vaginal odor  Musculoskeletal: Not Present- Joint Pain and Muscle Pain. Neurological: Not Present- Dizziness, Fainting, Headaches and Seizures. Psychiatric: Not Present- Anxiety, Depression, Mood changes and Panic Attacks. Endocrine: Not Present- Appetite Changes, Cold Intolerance, Excessive Thirst, Excessive Urination and Heat Intolerance. Hematology: Not Present- Abnormal Bleeding, Easy Bruising and Enlarged Lymph Nodes. PHYSICAL EXAM:    /78     Physical Exam   General   Mental Status - Alert. General Appearance - Cooperative. Abdomen   Inspection: - Inspection Normal.   Palpation/Percussion: Palpation and Percussion of the abdomen reveal - Non Tender, No Rebound tenderness, No Rigidity (guarding), No hepatosplenomegaly, No Palpable abdominal masses and Soft. Auscultation: Auscultation of the abdomen reveals - Bowel sounds normal.     Female Genitourinary     External Genitalia   Vulva: - Normal. Perineum - Normal. Bartholin's Gland - Bilateral - Normal. Clitoris - Normal.   Introitus: Characteristics - Normal.   Urethra: Characteristics - Normal.     Speculum & Bimanual   Vagina: Vaginal Mucosa - Normal.   Vaginal Wall: - Normal.   Vaginal Lesions - None. Cervix: Characteristics - Normal.   Uterus: Characteristics - Normal.   Adnexa: - Normal.   Bladder - Normal.     Lymphatics  No cervical, axillary or groin adenopathy            Medical problems and test results were reviewed with the patient today.      ASSESSMENT and PLAN    Diagnoses and all orders for this visit:    Abnormal uterine bleeding (AUB)  -     US NON OB TRANSVAGINAL    Vaginal irritation  -     AMB POC SMEAR, STAIN & INTERPRET, WET MOUNT    Yeast infection  -     fluconazole (DIFLUCAN) 150 MG tablet; 1 tablet now and repeat dose in 4 days  -     nystatin-triamcinolone (MYCOLOG II) 646503-8.1 UNIT/GM-% cream; Apply topically 3 times daily. -     terconazole (TERAZOL 7) 0.4 % vaginal cream; Place vaginally nightly. Comment   76040-----bloating/aub   HISTORY: Bloating----No period since IUD was placed. Then 3-4 days ago had bright red bleeding that has now   turned dark and spotting. Patient has surgery 3/2/22 for right cyst.   COMPARISON: Ultrasound 3/14/22---iud and normal   Enlarged uterus = 5wks   Endometrium = 2.1mm with IUD seen and in place with arms extended. Rt ovary is normal.   Lt ovary is normal.   No free fluid noted in the pelvis. Date: 06/09/2022 Perf. Physician: Dr. Merilyn Schwab, MD Sonographer: Lazara Chung, [address] [place] [address] done in my office and discussed with patient. Reassured. Wet prep done and reviewed by me - yeast.          Time:  I spent  30 minutes in preparing to see patient (including chart review and preparation), obtaining and/or reviewing additional medical history, performing a physical exam and evaluation, documenting clinical information in the electronic health record, independently interpreting results, communicating results to patient, family or caregiver, and/or coordinating care. No follow-up provider specified.         Shemar Magdaleno MD

## 2022-10-19 DIAGNOSIS — B37.9 YEAST INFECTION: ICD-10-CM

## 2022-10-19 RX ORDER — FLUCONAZOLE 150 MG/1
TABLET ORAL
Qty: 2 TABLET | Refills: 1 | Status: SHIPPED | OUTPATIENT
Start: 2022-10-19

## 2022-12-28 ENCOUNTER — OFFICE VISIT (OUTPATIENT)
Dept: GYNECOLOGY | Age: 27
End: 2022-12-28
Payer: COMMERCIAL

## 2022-12-28 ENCOUNTER — TELEPHONE (OUTPATIENT)
Dept: GYNECOLOGY | Age: 27
End: 2022-12-28

## 2022-12-28 VITALS
WEIGHT: 159 LBS | HEIGHT: 69 IN | SYSTOLIC BLOOD PRESSURE: 118 MMHG | BODY MASS INDEX: 23.55 KG/M2 | DIASTOLIC BLOOD PRESSURE: 80 MMHG

## 2022-12-28 DIAGNOSIS — N83.201 CYST OF RIGHT OVARY: ICD-10-CM

## 2022-12-28 DIAGNOSIS — R10.2 PELVIC PAIN: Primary | ICD-10-CM

## 2022-12-28 PROCEDURE — 99214 OFFICE O/P EST MOD 30 MIN: CPT | Performed by: OBSTETRICS & GYNECOLOGY

## 2022-12-28 PROCEDURE — 76830 TRANSVAGINAL US NON-OB: CPT | Performed by: OBSTETRICS & GYNECOLOGY

## 2022-12-28 NOTE — PROGRESS NOTES
HPI  Maximino Roque is a 24 y.o. female seen for RLQ pain x 2 weeks. The pain became worse with intercourse. She has a hx of right ovarian cyst in March for which she had surgery. She has IUD - had blood clot after knee surgery. Past Medical History, Past Surgical History, Family history, Social History, and Medications were all reviewed with the patient today and updated as necessary. Current Outpatient Medications   Medication Sig    omeprazole (PRILOSEC) 40 MG delayed release capsule     budesonide (PULMICORT) 0.5 MG/2ML nebulizer suspension Indications: ok to substitue. to include rinse bottle. Add 10mL of medication to 240mL of saline. Irrigate 2x daily. Budesonide 0.6mg/10 mL    albuterol sulfate  (90 Base) MCG/ACT inhaler Inhale 2 puffs into the lungs every 4 hours as needed    EPINEPHrine (EPIPEN) 0.3 MG/0.3ML SOAJ injection INJECT INTO OUTER THIGH FOR SEVERE ALLERGIC REACTION. CALL 911 AFTER USE. fexofenadine (ALLEGRA) 180 MG tablet Take 180 mg by mouth daily    amoxicillin-clavulanate (AUGMENTIN) 875-125 MG per tablet  (Patient not taking: Reported on 12/28/2022)    predniSONE (DELTASONE) 20 MG tablet 2 tabs daily (Patient not taking: Reported on 12/28/2022)     No current facility-administered medications for this visit.      Allergies   Allergen Reactions    Acetazolamide Rash    Cherry Anaphylaxis     Cherry     Sulfa Antibiotics Anaphylaxis    Nickel Rash     Past Medical History:   Diagnosis Date    Allergy     DVT of axillary vein, acute left ([address]     2014-after knee sugery    Neurological disorder     migraine    URI (upper respiratory infection)      Past Surgical History:   Procedure Laterality Date    ADENOIDECTOMY      GYN  03/02/2022    Diagnostic laparoscopy.; RIGHT OVARIAN CYST WALL\":     HEENT      sinus    ORTHOPEDIC SURGERY Bilateral     knee surgeries     Family History   Problem Relation Age of Onset    Colon Cancer Neg Hx     Breast Cancer Neg Hx     Rheum Arthritis Mother       Social History     Tobacco Use    Smoking status: Never    Smokeless tobacco: Never   Substance Use Topics    Alcohol use: Not Currently       Social History     Substance and Sexual Activity   Sexual Activity Yes    Birth control/protection: I.U.D. Comment: 10/2021 Luca     OB History    Para Term  AB Living   0 0 0 0 0 0   SAB IAB Ectopic Molar Multiple Live Births   0 0 0 0 0 0       Health Maintenance  Mammogram:   Colonoscopy:   Bone Density:  Pap Smear-10-8-21 Negative    ROS:    Review of Systems  General: Not Present- Chills, Fever, Fatigue, Insomnia, Hot flashes/Night sweats, Weight gain  Skin: Not Present- Bruising, Change in Wart/Mole, Excessive Sweating, Itching, Nail Changes, New Lesions, Rash, Skin Color Changes and Ulcer. HEENT: Not Present- Headache, Blurred Vision, Double Vision, Glaucoma, Visual Disturbances, Hearing Loss, Ringing in the Ears, Vertigo, Nose Bleed, Bleeding Gums, Hoarseness and Sore Throat. Neck: Not Present- Neck Pain and Neck Swelling. Respiratory: Not Present- Cough, Difficulty Breathing and Difficulty Breathing on Exertion. Breast: Not Present- Breast Mass, Breast Pain, Breast Swelling, Nipple Discharge, Nipple Pain, Recent Breast Size Changes and Skin Changes. Cardiovascular: Not Present- Abnormal Blood Pressure, Chest Pain, Edema, Fainting / Blacking Out, Palpitations, Shortness of Breath and Swelling of Extremities. Gastrointestinal: Not Present- Abdominal Pain, Abdominal Swelling, Bloating, Change in Bowel Habits, Constipation, Diarrhea, Difficulty Swallowing, Gets full quickly at meals, Nausea, Rectal Bleeding and Vomiting. Female Genitourinary: Not Present- Dysmenorrhea, Dyspareunia, Decreased libido, Excessive Menstrual Bleeding, Menstrual Irregularities, Pelvic Pain, Urinary Complaints, Vaginal Discharge, Vaginal itching/burning, Vaginal odor  Musculoskeletal: Not Present- Joint Pain and Muscle Pain.   Neurological: Not Present- Dizziness, Fainting, Headaches and Seizures. Psychiatric: Not Present- Anxiety, Depression, Mood changes and Panic Attacks. Endocrine: Not Present- Appetite Changes, Cold Intolerance, Excessive Thirst, Excessive Urination and Heat Intolerance. Hematology: Not Present- Abnormal Bleeding, Easy Bruising and Enlarged Lymph Nodes. PHYSICAL EXAM:    /80   Ht 5' 8.5\" (1.74 m)   Wt 159 lb (72.1 kg)   BMI 23.82 kg/m²     Constitutional: Vital signs are normal. She appears well-developed and well-nourished. She is active and cooperative. Neurological: She is alert. Nursing note and vitals reviewed. Medical problems and test results were reviewed with the patient today. ASSESSMENT and PLAN    1. Pelvic pain  -     US NON OB TRANSVAGINAL  2. Cyst of right ovary     Comment   94274----frzhxy pain   HISTORY: Had surgery for right ovarian hemorrhagic cyst 3/2/22 with [place] [facility]. Has had right sided pain for 2   weeks. COMPARISON: Ultrasound 6/9/22----5wks with IUD   ---------------------------------------------------------------------------------------------------------------   Enlarged uterus = 5wks   Endometrium = 1.8mm with IUD seen and in place with arms extended. Rt ovary has large complex hemorrhagic cyst that measures 7.2/3.7/5.0cm. There is normal vascular flow noted to   the ovarian tissue. Lt ovary is normal.   No free fluid noted in the pelvis. Gyn Report [facility] Page 2/2 Women[facility]   Name: Marisela Burk Patient ID: [id number]   Date: 12/28/2022 Perf. Physician: Dr. Nicholas Small MD Sonographer: Lorna Patton 1 done in my office and discussed with patient. She does not feel the pain is severe enough to warrant surgery. Discussed other methods of birth control but with her hx of blood clot even though it was associated with knee surgery will continue with the IUD.   She will return in 1 month for follow up US                  Time:  I spent  30 minutes in preparing to see patient (including chart review and preparation), obtaining and/or reviewing additional medical history, performing a physical exam and evaluation, documenting clinical information in the electronic health record, independently interpreting results, communicating results to patient, family or caregiver, and/or coordinating care. No follow-ups on file.        Neelima Burns MD

## 2022-12-28 NOTE — TELEPHONE ENCOUNTER
Patient called and said she wanted to go ahead and have surgery for the ovarian cyst. She has seen Dr Rajesh Vela in the past and would be fine with him operating. Advised that we would have to see who would have the first opening to operate and that is who would do the surgery. Dr Mallory Bob will contact the surgery scheduler at [facility] and they will call her with information.

## 2022-12-29 ENCOUNTER — PREP FOR PROCEDURE (OUTPATIENT)
Dept: OBGYN CLINIC | Age: 27
End: 2022-12-29

## 2022-12-29 ENCOUNTER — ANESTHESIA EVENT (OUTPATIENT)
Dept: SURGERY | Age: 27
End: 2022-12-29
Payer: COMMERCIAL

## 2022-12-29 PROBLEM — N83.201 RIGHT OVARIAN CYST: Status: ACTIVE | Noted: 2022-12-29

## 2022-12-29 RX ORDER — SCOLOPAMINE TRANSDERMAL SYSTEM 1 MG/1
1 PATCH, EXTENDED RELEASE TRANSDERMAL
Status: CANCELLED | OUTPATIENT
Start: 2022-12-29 | End: 2023-01-01

## 2022-12-29 RX ORDER — SODIUM CHLORIDE, SODIUM LACTATE, POTASSIUM CHLORIDE, CALCIUM CHLORIDE 600; 310; 30; 20 MG/100ML; MG/100ML; MG/100ML; MG/100ML
INJECTION, SOLUTION INTRAVENOUS CONTINUOUS
Status: CANCELLED | OUTPATIENT
Start: 2022-12-29

## 2022-12-29 RX ORDER — MIDAZOLAM HYDROCHLORIDE 2 MG/2ML
2 INJECTION, SOLUTION INTRAMUSCULAR; INTRAVENOUS
Status: CANCELLED | OUTPATIENT
Start: 2022-12-29 | End: 2022-12-30

## 2022-12-29 RX ORDER — SODIUM CHLORIDE 0.9 % (FLUSH) 0.9 %
5-40 SYRINGE (ML) INJECTION EVERY 12 HOURS SCHEDULED
Status: CANCELLED | OUTPATIENT
Start: 2022-12-29

## 2022-12-29 RX ORDER — ACETAMINOPHEN 500 MG
1000 TABLET ORAL ONCE
Status: CANCELLED | OUTPATIENT
Start: 2022-12-29 | End: 2022-12-29

## 2022-12-29 RX ORDER — SODIUM CHLORIDE 0.9 % (FLUSH) 0.9 %
5-40 SYRINGE (ML) INJECTION PRN
Status: CANCELLED | OUTPATIENT
Start: 2022-12-29

## 2022-12-29 RX ORDER — LIDOCAINE HYDROCHLORIDE 10 MG/ML
1 INJECTION, SOLUTION INFILTRATION; PERINEURAL
Status: CANCELLED | OUTPATIENT
Start: 2022-12-29 | End: 2022-12-30

## 2022-12-29 RX ORDER — SODIUM CHLORIDE 9 MG/ML
INJECTION, SOLUTION INTRAVENOUS PRN
Status: CANCELLED | OUTPATIENT
Start: 2022-12-29

## 2022-12-29 NOTE — PERIOP NOTE
Patient verified name and . Order for consent not found in EHR     Type 2 surgery, PAT phone assessment complete. Orders not received. Labs per surgeon: None  Labs per anesthesia protocol: Hgb to be drawn DOS    Patient answered medical/surgical history questions at their best of ability. All prior to admission medications documented in [facility]. Patient instructed to take the following medications the day of surgery according to anesthesia guidelines with a small sip of water: Allegra, Omeprazole, use pulmicort nebulizer and bring Albuterol inhaler to hospital.  On the day before surgery please take Acetaminophen 1000mg in the morning and then again before bed. You may substitute for Tylenol 650 mg. Hold all vitamins 7 days prior to surgery and NSAIDS 5 days prior to surgery. Prescription meds to hold:None  Patient instructed on the following:    > Arrive at A Entrance, time of arrival to be called the day before by 1700  > NPO after midnight, unless otherwise indicated, including gum, mints, and ice chips  > Responsible adult must drive patient to the hospital, stay during surgery, and patient will need supervision 24 hours after anesthesia  > Use antibacterial soap in shower the night before surgery and on the morning of surgery  > All piercings must be removed prior to arrival.    > Leave all valuables (money and jewelry) at home but bring insurance card and ID on DOS.   > You may be required to pay a deductible or co-pay on the day of your procedure. You can pre-pay by calling [phone] if your surgery is at the [facility] or [phone] if your surgery is at the [facility]. > Do not wear make-up, nail polish, lotions, cologne, perfumes, powders, or oil on skin. Artificial nails are not permitted.

## 2022-12-30 ENCOUNTER — ANESTHESIA (OUTPATIENT)
Dept: SURGERY | Age: 27
End: 2022-12-30
Payer: COMMERCIAL

## 2022-12-30 ENCOUNTER — HOSPITAL ENCOUNTER (OUTPATIENT)
Age: 27
Setting detail: OUTPATIENT SURGERY
Discharge: HOME | End: 2022-12-30
Attending: OBSTETRICS & GYNECOLOGY | Admitting: OBSTETRICS & GYNECOLOGY
Payer: COMMERCIAL

## 2022-12-30 VITALS
HEIGHT: 70 IN | RESPIRATION RATE: 16 BRPM | DIASTOLIC BLOOD PRESSURE: 65 MMHG | BODY MASS INDEX: 22.82 KG/M2 | WEIGHT: 159.39 LBS | HEART RATE: 56 BPM | OXYGEN SATURATION: 95 % | SYSTOLIC BLOOD PRESSURE: 105 MMHG | TEMPERATURE: 98.2 F

## 2022-12-30 DIAGNOSIS — N83.201 RIGHT OVARIAN CYST: Primary | ICD-10-CM

## 2022-12-30 LAB
HCG UR QL: NEGATIVE
HGB BLD-MCNC: 14.1 G/DL (ref 11.7–15.4)

## 2022-12-30 PROCEDURE — 58301 REMOVE INTRAUTERINE DEVICE: CPT | Performed by: OBSTETRICS & GYNECOLOGY

## 2022-12-30 PROCEDURE — 6360000002 HC RX W HCPCS: Performed by: ANESTHESIOLOGY

## 2022-12-30 PROCEDURE — 2500000003 HC RX 250 WO HCPCS: Performed by: NURSE ANESTHETIST, CERTIFIED REGISTERED

## 2022-12-30 PROCEDURE — 7100000011 HC PHASE II RECOVERY - ADDTL 15 MIN: Performed by: OBSTETRICS & GYNECOLOGY

## 2022-12-30 PROCEDURE — 2580000003 HC RX 258: Performed by: NURSE ANESTHETIST, CERTIFIED REGISTERED

## 2022-12-30 PROCEDURE — 3700000000 HC ANESTHESIA ATTENDED CARE: Performed by: OBSTETRICS & GYNECOLOGY

## 2022-12-30 PROCEDURE — 7100000010 HC PHASE II RECOVERY - FIRST 15 MIN: Performed by: OBSTETRICS & GYNECOLOGY

## 2022-12-30 PROCEDURE — 85018 HEMOGLOBIN: CPT

## 2022-12-30 PROCEDURE — 2709999900 HC NON-CHARGEABLE SUPPLY: Performed by: OBSTETRICS & GYNECOLOGY

## 2022-12-30 PROCEDURE — 6360000002 HC RX W HCPCS: Performed by: NURSE ANESTHETIST, CERTIFIED REGISTERED

## 2022-12-30 PROCEDURE — 7100000001 HC PACU RECOVERY - ADDTL 15 MIN: Performed by: OBSTETRICS & GYNECOLOGY

## 2022-12-30 PROCEDURE — 3600000014 HC SURGERY LEVEL 4 ADDTL 15MIN: Performed by: OBSTETRICS & GYNECOLOGY

## 2022-12-30 PROCEDURE — 7100000000 HC PACU RECOVERY - FIRST 15 MIN: Performed by: OBSTETRICS & GYNECOLOGY

## 2022-12-30 PROCEDURE — 6370000000 HC RX 637 (ALT 250 FOR IP): Performed by: ANESTHESIOLOGY

## 2022-12-30 PROCEDURE — 2500000003 HC RX 250 WO HCPCS: Performed by: OBSTETRICS & GYNECOLOGY

## 2022-12-30 PROCEDURE — 3600000004 HC SURGERY LEVEL 4 BASE: Performed by: OBSTETRICS & GYNECOLOGY

## 2022-12-30 PROCEDURE — 3700000001 HC ADD 15 MINUTES (ANESTHESIA): Performed by: OBSTETRICS & GYNECOLOGY

## 2022-12-30 PROCEDURE — 58662 LAPAROSCOPY EXCISE LESIONS: CPT | Performed by: OBSTETRICS & GYNECOLOGY

## 2022-12-30 PROCEDURE — 6360000002 HC RX W HCPCS: Performed by: OBSTETRICS & GYNECOLOGY

## 2022-12-30 PROCEDURE — 81025 URINE PREGNANCY TEST: CPT

## 2022-12-30 PROCEDURE — 88305 TISSUE EXAM BY PATHOLOGIST: CPT

## 2022-12-30 RX ORDER — SODIUM CHLORIDE 0.9 % (FLUSH) 0.9 %
5-40 SYRINGE (ML) INJECTION EVERY 12 HOURS SCHEDULED
Status: DISCONTINUED | OUTPATIENT
Start: 2022-12-30 | End: 2022-12-30 | Stop reason: HOSPADM

## 2022-12-30 RX ORDER — BUPIVACAINE HYDROCHLORIDE 5 MG/ML
INJECTION, SOLUTION EPIDURAL; INTRACAUDAL PRN
Status: DISCONTINUED | OUTPATIENT
Start: 2022-12-30 | End: 2022-12-30 | Stop reason: HOSPADM

## 2022-12-30 RX ORDER — NEOSTIGMINE METHYLSULFATE 1 MG/ML
INJECTION, SOLUTION INTRAVENOUS PRN
Status: DISCONTINUED | OUTPATIENT
Start: 2022-12-30 | End: 2022-12-30 | Stop reason: SDUPTHER

## 2022-12-30 RX ORDER — FENTANYL CITRATE 50 UG/ML
INJECTION, SOLUTION INTRAMUSCULAR; INTRAVENOUS PRN
Status: DISCONTINUED | OUTPATIENT
Start: 2022-12-30 | End: 2022-12-30 | Stop reason: SDUPTHER

## 2022-12-30 RX ORDER — ONDANSETRON 2 MG/ML
4 INJECTION INTRAMUSCULAR; INTRAVENOUS
Status: DISCONTINUED | OUTPATIENT
Start: 2022-12-30 | End: 2022-12-30 | Stop reason: HOSPADM

## 2022-12-30 RX ORDER — HYDROMORPHONE HYDROCHLORIDE 1 MG/ML
0.5 INJECTION, SOLUTION INTRAMUSCULAR; INTRAVENOUS; SUBCUTANEOUS EVERY 10 MIN PRN
Status: DISCONTINUED | OUTPATIENT
Start: 2022-12-30 | End: 2022-12-30 | Stop reason: HOSPADM

## 2022-12-30 RX ORDER — ONDANSETRON 2 MG/ML
INJECTION INTRAMUSCULAR; INTRAVENOUS PRN
Status: DISCONTINUED | OUTPATIENT
Start: 2022-12-30 | End: 2022-12-30 | Stop reason: SDUPTHER

## 2022-12-30 RX ORDER — LIDOCAINE HYDROCHLORIDE 20 MG/ML
INJECTION, SOLUTION EPIDURAL; INFILTRATION; INTRACAUDAL; PERINEURAL PRN
Status: DISCONTINUED | OUTPATIENT
Start: 2022-12-30 | End: 2022-12-30 | Stop reason: SDUPTHER

## 2022-12-30 RX ORDER — OXYCODONE HYDROCHLORIDE 5 MG/1
5 TABLET ORAL PRN
Status: DISCONTINUED | OUTPATIENT
Start: 2022-12-30 | End: 2022-12-30 | Stop reason: HOSPADM

## 2022-12-30 RX ORDER — SODIUM CHLORIDE, SODIUM LACTATE, POTASSIUM CHLORIDE, CALCIUM CHLORIDE 600; 310; 30; 20 MG/100ML; MG/100ML; MG/100ML; MG/100ML
INJECTION, SOLUTION INTRAVENOUS CONTINUOUS
Status: DISCONTINUED | OUTPATIENT
Start: 2022-12-30 | End: 2022-12-30 | Stop reason: HOSPADM

## 2022-12-30 RX ORDER — PROCHLORPERAZINE EDISYLATE 5 MG/ML
5 INJECTION INTRAMUSCULAR; INTRAVENOUS
Status: COMPLETED | OUTPATIENT
Start: 2022-12-30 | End: 2022-12-30

## 2022-12-30 RX ORDER — DEXAMETHASONE SODIUM PHOSPHATE 10 MG/ML
INJECTION INTRAMUSCULAR; INTRAVENOUS PRN
Status: DISCONTINUED | OUTPATIENT
Start: 2022-12-30 | End: 2022-12-30 | Stop reason: SDUPTHER

## 2022-12-30 RX ORDER — GLYCOPYRROLATE 0.2 MG/ML
INJECTION INTRAMUSCULAR; INTRAVENOUS PRN
Status: DISCONTINUED | OUTPATIENT
Start: 2022-12-30 | End: 2022-12-30 | Stop reason: SDUPTHER

## 2022-12-30 RX ORDER — ACETAMINOPHEN 500 MG
1000 TABLET ORAL ONCE
Status: COMPLETED | OUTPATIENT
Start: 2022-12-30 | End: 2022-12-30

## 2022-12-30 RX ORDER — SODIUM CHLORIDE 9 MG/ML
INJECTION, SOLUTION INTRAVENOUS PRN
Status: DISCONTINUED | OUTPATIENT
Start: 2022-12-30 | End: 2022-12-30 | Stop reason: HOSPADM

## 2022-12-30 RX ORDER — HYDROMORPHONE HYDROCHLORIDE 1 MG/ML
0.25 INJECTION, SOLUTION INTRAMUSCULAR; INTRAVENOUS; SUBCUTANEOUS EVERY 5 MIN PRN
Status: DISCONTINUED | OUTPATIENT
Start: 2022-12-30 | End: 2022-12-30 | Stop reason: HOSPADM

## 2022-12-30 RX ORDER — APREPITANT 40 MG/1
40 CAPSULE ORAL ONCE
Status: COMPLETED | OUTPATIENT
Start: 2022-12-30 | End: 2022-12-30

## 2022-12-30 RX ORDER — SODIUM CHLORIDE 0.9 % (FLUSH) 0.9 %
5-40 SYRINGE (ML) INJECTION PRN
Status: DISCONTINUED | OUTPATIENT
Start: 2022-12-30 | End: 2022-12-30 | Stop reason: HOSPADM

## 2022-12-30 RX ORDER — PROPOFOL 10 MG/ML
INJECTION, EMULSION INTRAVENOUS PRN
Status: DISCONTINUED | OUTPATIENT
Start: 2022-12-30 | End: 2022-12-30 | Stop reason: SDUPTHER

## 2022-12-30 RX ORDER — MIDAZOLAM HYDROCHLORIDE 2 MG/2ML
2 INJECTION, SOLUTION INTRAMUSCULAR; INTRAVENOUS
Status: COMPLETED | OUTPATIENT
Start: 2022-12-30 | End: 2022-12-30

## 2022-12-30 RX ORDER — FENTANYL CITRATE 50 UG/ML
100 INJECTION, SOLUTION INTRAMUSCULAR; INTRAVENOUS
Status: DISCONTINUED | OUTPATIENT
Start: 2022-12-30 | End: 2022-12-30 | Stop reason: HOSPADM

## 2022-12-30 RX ORDER — ROCURONIUM BROMIDE 10 MG/ML
INJECTION, SOLUTION INTRAVENOUS PRN
Status: DISCONTINUED | OUTPATIENT
Start: 2022-12-30 | End: 2022-12-30 | Stop reason: SDUPTHER

## 2022-12-30 RX ORDER — DIPHENHYDRAMINE HYDROCHLORIDE 50 MG/ML
12.5 INJECTION INTRAMUSCULAR; INTRAVENOUS
Status: DISCONTINUED | OUTPATIENT
Start: 2022-12-30 | End: 2022-12-30 | Stop reason: HOSPADM

## 2022-12-30 RX ORDER — SODIUM CHLORIDE, SODIUM LACTATE, POTASSIUM CHLORIDE, CALCIUM CHLORIDE 600; 310; 30; 20 MG/100ML; MG/100ML; MG/100ML; MG/100ML
INJECTION, SOLUTION INTRAVENOUS CONTINUOUS PRN
Status: DISCONTINUED | OUTPATIENT
Start: 2022-12-30 | End: 2022-12-30 | Stop reason: SDUPTHER

## 2022-12-30 RX ORDER — OXYCODONE HYDROCHLORIDE 5 MG/1
10 TABLET ORAL PRN
Status: DISCONTINUED | OUTPATIENT
Start: 2022-12-30 | End: 2022-12-30 | Stop reason: HOSPADM

## 2022-12-30 RX ORDER — KETOROLAC TROMETHAMINE 30 MG/ML
INJECTION, SOLUTION INTRAMUSCULAR; INTRAVENOUS PRN
Status: DISCONTINUED | OUTPATIENT
Start: 2022-12-30 | End: 2022-12-30 | Stop reason: SDUPTHER

## 2022-12-30 RX ORDER — HYDROMORPHONE HYDROCHLORIDE 1 MG/ML
0.5 INJECTION, SOLUTION INTRAMUSCULAR; INTRAVENOUS; SUBCUTANEOUS EVERY 5 MIN PRN
Status: DISCONTINUED | OUTPATIENT
Start: 2022-12-30 | End: 2022-12-30 | Stop reason: HOSPADM

## 2022-12-30 RX ORDER — OXYCODONE HYDROCHLORIDE AND ACETAMINOPHEN 5; 325 MG/1; MG/1
1 TABLET ORAL EVERY 6 HOURS PRN
Qty: 12 TABLET | Refills: 0 | Status: SHIPPED | OUTPATIENT
Start: 2022-12-30 | End: 2023-01-02

## 2022-12-30 RX ADMIN — ONDANSETRON 4 MG: 2 INJECTION INTRAMUSCULAR; INTRAVENOUS at 11:27

## 2022-12-30 RX ADMIN — SODIUM CHLORIDE, SODIUM LACTATE, POTASSIUM CHLORIDE, AND CALCIUM CHLORIDE: 600; 310; 30; 20 INJECTION, SOLUTION INTRAVENOUS at 12:17

## 2022-12-30 RX ADMIN — PROCHLORPERAZINE EDISYLATE 5 MG: 5 INJECTION INTRAMUSCULAR; INTRAVENOUS at 12:53

## 2022-12-30 RX ADMIN — MIDAZOLAM 2 MG: 1 INJECTION INTRAMUSCULAR; INTRAVENOUS at 11:02

## 2022-12-30 RX ADMIN — ROCURONIUM BROMIDE 30 MG: 50 INJECTION, SOLUTION INTRAVENOUS at 11:15

## 2022-12-30 RX ADMIN — FENTANYL CITRATE 50 MCG: 50 INJECTION, SOLUTION INTRAMUSCULAR; INTRAVENOUS at 11:38

## 2022-12-30 RX ADMIN — FENTANYL CITRATE 50 MCG: 50 INJECTION, SOLUTION INTRAMUSCULAR; INTRAVENOUS at 11:47

## 2022-12-30 RX ADMIN — Medication 3 MG: at 12:16

## 2022-12-30 RX ADMIN — APREPITANT 40 MG: 40 CAPSULE ORAL at 11:05

## 2022-12-30 RX ADMIN — LIDOCAINE HYDROCHLORIDE 100 MG: 20 INJECTION, SOLUTION EPIDURAL; INFILTRATION; INTRACAUDAL; PERINEURAL at 11:15

## 2022-12-30 RX ADMIN — GLYCOPYRROLATE 0.4 MG: 0.2 INJECTION, SOLUTION INTRAMUSCULAR; INTRAVENOUS at 12:16

## 2022-12-30 RX ADMIN — PROPOFOL 200 MG: 10 INJECTION, EMULSION INTRAVENOUS at 11:15

## 2022-12-30 RX ADMIN — ACETAMINOPHEN 1000 MG: 500 TABLET, FILM COATED ORAL at 10:51

## 2022-12-30 RX ADMIN — KETOROLAC TROMETHAMINE 30 MG: 30 INJECTION, SOLUTION INTRAMUSCULAR; INTRAVENOUS at 12:04

## 2022-12-30 RX ADMIN — FENTANYL CITRATE 50 MCG: 50 INJECTION, SOLUTION INTRAMUSCULAR; INTRAVENOUS at 11:15

## 2022-12-30 RX ADMIN — SODIUM CHLORIDE, SODIUM LACTATE, POTASSIUM CHLORIDE, AND CALCIUM CHLORIDE: 600; 310; 30; 20 INJECTION, SOLUTION INTRAVENOUS at 10:59

## 2022-12-30 RX ADMIN — Medication 2000 MG: at 11:03

## 2022-12-30 RX ADMIN — FENTANYL CITRATE 50 MCG: 50 INJECTION, SOLUTION INTRAMUSCULAR; INTRAVENOUS at 12:32

## 2022-12-30 RX ADMIN — DEXAMETHASONE SODIUM PHOSPHATE 8 MG: 10 INJECTION INTRAMUSCULAR; INTRAVENOUS at 11:27

## 2022-12-30 NOTE — PERIOP NOTE
MD Velasco  at bedside with patient. Pt VSS stable. Pain and Nausea controlled at this time. Verbal sign out per MD when pacu cares completed. Plan of care continues.

## 2022-12-30 NOTE — DISCHARGE INSTRUCTIONS
INSTRUCTIONS FOLLOWING GYN LAPAROSCOPY    ACTIVITY   Limit activity today; increase activity tomorrow, but no vigorous exercise   Shower only; no tub baths   No douches, tampons or intercourse until your doctor releases you (at least 2 weeks)   May return to work or school as directed by your doctor    DIET   Clear liquids until no nausea or vomiting   Advance to regular diet as tolerated    PAIN   Expect a moderate amount of pain. Take pain medication as directed by your doctor. If no prescription for pain is sent home with you, take the appropriate dose of your commonly used pain medication. Call you doctor if pain is NOT relieved by medication. DO NOT take aspirin or blood thinners until directed by your doctor. You will probably have bloody discharge (like a period) for 1-2 days, then watery discharge for another 7 days. You will want to use a perineal pad, not tampons for this. DRESSING CARE   Change dressing / band aids as directed by your doctor. You will experience bleeding similar to a period for the next couple of days followed by watery discharge up to seven more days. FOLLOW PHONE [facility] will be made by nursing staff. If you have any problems or concerns, call your doctor as needed. CALL YOUR DOCTOR IF   Excessive bleeding that does not stop after holding mild pressure over the area for 15 minutes   You soak a pad in an hour   Temperature of 101°F or above   Green or yellow, smelly drainage or discharge   You are unable to urinate by bedtime   Nausea and vomiting that does not stop by bedtime    MEDICATION INTERACTION:    During your procedure you potentially received a medication or medications which may reduce the effectiveness of oral contraceptives. Please consider other forms of contraception for 1 month following your procedure if you are currently using oral contraceptives as your primary form of birth control.  In addition to this, we recommend continuing your oral contraceptive as prescribed, unless otherwise instructed by your physician, during this time. After general anesthesia or intravenous sedation, for 24 hours or while taking prescription Narcotics:  Limit your activities  A responsible adult needs to be with you for the next 24 hours  Do not drive and operate hazardous machinery  Do not make important personal or business decisions  Do not drink alcoholic beverages  If you have not urinated within 8 hours after discharge, and you are experiencing discomfort from urinary retention, please go to the nearest ED. If you have sleep apnea and have a CPAP machine, please use it for all naps and sleeping. Please use caution when taking narcotics and any of your home medications that may cause drowsiness. *  Please give a list of your current medications to your Primary Care Provider. *  Please update this list whenever your medications are discontinued, doses are      changed, or new medications (including over-the-counter products) are added. *  Please carry medication information at all times in case of emergency situations. These are general instructions for a healthy lifestyle:  No smoking/ No tobacco products/ Avoid exposure to second hand smoke  Surgeon General's Warning:  Quitting smoking now greatly reduces serious risk to your health. Obesity, smoking, and sedentary lifestyle greatly increases your risk for illness  A healthy diet, regular physical exercise & weight monitoring are important for maintaining a healthy lifestyle    You may be retaining fluid if you have a history of heart failure or if you experience any of the following symptoms:  Weight gain of 3 pounds or more overnight or 5 pounds in a week, increased swelling in our hands or feet or shortness of breath while lying flat in bed. Please call your doctor as soon as you notice any of these symptoms; do not wait until your next office visit.

## 2022-12-30 NOTE — BRIEF OP NOTE
BRIEF OP NOTE  Pre-Op Diagnosis: Right ovarian cyst [N83.201]  Post-Op Diagnosis: same  Procedure: Procedure(s):  RIGHT OVARIAN CYSTECTOMY LAPAROSCOPIC/ IUD REMOVAL    Surgeon: Joaquim Clark MD  Assistant(s): none   Anesthesia: GETA   Estimated Blood Loss:  10cc  Specimens:   ID Type Source Tests Collected by Time Destination   A : Right ovarian cyst wall Tissue [facility] SURGICAL PATHOLOGY Angeline Enamorado MD 12/30/2022 1209       Findings: Right ovarian cyst, sero-sanguinous.   Complications: none

## 2022-12-30 NOTE — ANESTHESIA POSTPROCEDURE EVALUATION
Department of Anesthesiology  Postprocedure Note    Patient: Annabel Boyer  MRN: [id number]  YOB: 1995  Date of evaluation: 12/30/2022      Procedure Summary     Date: 12/30/22 Room / Location: [facility] OR 03 / [facility]    Anesthesia Start: 1104 Anesthesia Stop: 1239    Procedure: RIGHT OVARIAN CYSTECTOMY LAPAROSCOPIC/ IUD REMOVAL (Right: Abdomen) Diagnosis:       Right ovarian cyst      (Right ovarian cyst [N83.201])    Surgeons: Andres Syed MD Responsible Provider: Aliza Murry MD    Anesthesia Type: general ASA Status: 2          Anesthesia Type: No value filed.     Osmin Phase I: Osmin Score: 9    Osmin Phase II:        Anesthesia Post Evaluation    Patient location during evaluation: PACU  Patient participation: complete - patient participated  Level of consciousness: awake and alert  Airway patency: patent  Nausea & Vomiting: no nausea  Cardiovascular status: hemodynamically stable  Respiratory status: acceptable  Hydration status: euvolemic

## 2022-12-30 NOTE — H&P
Subjective:     [address], MRN: [id number], is a 24 y.o.  female presents with right ovarian cyst 7 cm seen per U/S 2 days ago. . gradually worsening course. Hx of same ovary with spontaneous rupture almost 1 year ago. See office notes on care. Patient Active Problem List    Diagnosis Date Noted    Right ovarian cyst 12/29/2022    Chronic sinusitis 05/06/2021    Migraine with aura and without status migrainosus, not intractable 04/29/2020    Atopic dermatitis 10/14/2019    Anaphylaxis due to exercise 10/14/2019    Intermittent asthma without complication 77/22/9987     Past Medical History:   Diagnosis Date    Allergy     Asthma     DVT of axillary vein, acute left ([address]     2014-after knee sugery    Neurological disorder     migraine    PONV (postoperative nausea and vomiting)     URI (upper respiratory infection)       Past Surgical History:   Procedure Laterality Date    ADENOIDECTOMY      GYN  03/02/2022    Diagnostic laparoscopy.; RIGHT OVARIAN CYST WALL\":     HEENT      sinus    ORTHOPEDIC SURGERY Bilateral     knee surgeries      Medications Prior to Admission: omeprazole (PRILOSEC) 40 MG delayed release capsule, Take 40 mg by mouth daily  budesonide (PULMICORT) 0.5 MG/2ML nebulizer suspension, Indications: ok to substitue. to include rinse bottle. Add 10mL of medication to 240mL of saline. Irrigate 2x daily. Budesonide 0.6mg/10 mL  albuterol sulfate  (90 Base) MCG/ACT inhaler, Inhale 2 puffs into the lungs every 4 hours as needed  EPINEPHrine (EPIPEN) 0.3 MG/0.3ML SOAJ injection, INJECT INTO OUTER THIGH FOR SEVERE ALLERGIC REACTION. CALL 911 AFTER USE.   fexofenadine (ALLEGRA) 180 MG tablet, Take 180 mg by mouth daily  Allergies   Allergen Reactions    Acetazolamide Rash    Cherry Anaphylaxis     Cherry     Sulfa Antibiotics Anaphylaxis    Nickel Rash      Social History     Tobacco Use    Smoking status: Never    Smokeless tobacco: Never   Substance Use Topics    Alcohol use: Not Currently      Family History   Problem Relation Age of Onset    Colon Cancer Neg Hx     Breast Cancer Neg Hx     Rheum Arthritis Mother             Review of Systems  Constitutional: negative  Respiratory: negative  Cardiovascular: negative  Musculoskeletal:negative    Objective:     Patient Vitals for the past 8 hrs:   BP Temp Temp src Pulse SpO2 Weight   12/30/22 1041 116/73 -- -- 65 99 % --   12/30/22 1040 -- 98.2 °F (36.8 °C) Temporal -- -- --   12/30/22 1035 -- -- -- -- -- 159 lb 6.3 oz (72.3 kg)       Intake/Output Summary (Last 24 hours) at 12/30/2022 1117  Last data filed at 12/30/2022 1115  Gross per 24 hour   Intake --   Output 0 ml   Net 0 ml     /73   Pulse 65   Temp 98.2 °F (36.8 °C) (Temporal)   Ht 5' 10\" (1.778 m)   Wt 159 lb 6.3 oz (72.3 kg)   SpO2 99%   BMI 22.87 kg/m²   General appearance: alert, appears stated age, cooperative, and no distress  Head: Normocephalic, without obvious abnormality, atraumatic  Back: symmetric, no curvature. ROM normal. No CVA tenderness. Lungs: clear to auscultation bilaterally  Heart: regular rate and rhythm, S1, S2 normal, no murmur, click, rub or gallop  Abdomen:  gravid at term  Pelvic:deferred to OR. Wishes to have IUD removed. Extremities: extremities normal, atraumatic, no cyanosis or edema  Pulses: 2+ and symmetric  Skin: Skin color, texture, turgor normal. No rashes or lesions      Assessment:         ROV cyst with lap drainage today. Discussed risks of infection, DVT, damage to bowel/bladder/other internal organs, bleeding/transfusion, scar tissue/adhesions. All questions answered, will proceed. Wants IUD removed.     Plan:     Dx laparoscopy with drainage of right ovarian cyst under general anesthesia, also removal of IUD

## 2022-12-30 NOTE — ANESTHESIA PRE PROCEDURE
Department of Anesthesiology  Preprocedure Note       Name:  Kat Benitez   Age:  24 y.o.  :  1995                                          MRN:  [id number]         Date:  2022      Surgeon: Abisai Catherine):  Viridiana Lerma MD    Procedure: Procedure(s):  RIGHT OVARIAN CYSTECTOMY LAPAROSCOPIC/ IUD REMOVAL    Medications prior to admission:   Prior to Admission medications    Medication Sig Start Date End Date Taking? Authorizing Provider   omeprazole (PRILOSEC) 40 MG delayed release capsule Take 40 mg by mouth daily 22   Historical Provider, MD   budesonide (PULMICORT) 0.5 MG/2ML nebulizer suspension Indications: ok to substitue. to include rinse bottle. Add 10mL of medication to 240mL of saline. Irrigate 2x daily. Budesonide 0.6mg/10 mL 21   Historical Provider, MD   albuterol sulfate  (90 Base) MCG/ACT inhaler Inhale 2 puffs into the lungs every 4 hours as needed 19   Ar Automatic Reconciliation   EPINEPHrine (EPIPEN) 0.3 MG/0.3ML SOAJ injection INJECT INTO OUTER THIGH FOR SEVERE ALLERGIC REACTION.  CALL 911 AFTER USE. 21   Ar Automatic Reconciliation   fexofenadine (ALLEGRA) 180 MG tablet Take 180 mg by mouth daily 21   Ar Automatic Reconciliation       Current medications:    Current Facility-Administered Medications   Medication Dose Route Frequency Provider Last Rate Last Admin    fentaNYL (SUBLIMAZE) injection 100 mcg  100 mcg IntraVENous Once PRN Taylor Lopez MD        sodium chloride flush 0.9 % injection 5-40 mL  5-40 mL IntraVENous 2 times per day Taylor Lopez MD        sodium chloride flush 0.9 % injection 5-40 mL  5-40 mL IntraVENous PRN Taylor Lopez MD        0.9 % sodium chloride infusion   IntraVENous PRN Taylor Lopez MD        midazolam PF (VERSED) injection 2 mg  2 mg IntraVENous Once PRN Taylor Lopez MD        ceFAZolin (ANCEF) 2000 mg in sterile water 20 mL IV syringe  2,000 mg IntraVENous Q8H Fozia Houston Emeterio Garvin MD           Allergies: Allergies   Allergen Reactions    Acetazolamide Rash    Cherry Anaphylaxis     Cherry     Sulfa Antibiotics Anaphylaxis    Nickel Rash       Problem List:    Patient Active Problem List   Diagnosis Code    Atopic dermatitis L20.9    Chronic sinusitis J32.9    Intermittent asthma without complication F22.98    Anaphylaxis due to exercise T78. 2XXA, Y93.89    Migraine with aura and without status migrainosus, not intractable G43. 80    Right ovarian cyst N83.201       Past Medical History:        Diagnosis Date    Allergy     Asthma     DVT of axillary vein, acute left ([address]     2014-after knee sugery    Neurological disorder     migraine    PONV (postoperative nausea and vomiting)     URI (upper respiratory infection)        Past Surgical History:        Procedure Laterality Date    ADENOIDECTOMY      GYN  03/02/2022    Diagnostic laparoscopy.; RIGHT OVARIAN CYST WALL\":     HEENT      sinus    ORTHOPEDIC SURGERY Bilateral     knee surgeries       Social History:    Social History     Tobacco Use    Smoking status: Never    Smokeless tobacco: Never   Substance Use Topics    Alcohol use: Not Currently                                Counseling given: Not Answered      Vital Signs (Current):   Vitals:    12/29/22 0813 12/30/22 1035 12/30/22 1040 12/30/22 1041   BP:    116/73   Pulse:    65   Temp:   98.2 °F (36.8 °C)    TempSrc:   Temporal    SpO2:    99%   Weight: 159 lb (72.1 kg) 159 lb 6.3 oz (72.3 kg)     Height: 5' 10\" (1.778 m)                                                 BP Readings from Last 3 Encounters:   12/30/22 116/73   12/28/22 118/80   06/09/22 108/78       NPO Status: Time of last liquid consumption: 2330                        Time of last solid consumption: 2330                        Date of last liquid consumption: 12/29/22                        Date of last solid food consumption: 12/29/22    BMI:   Wt Readings from Last 3 Encounters:   12/30/22 159 lb 6.3 oz (72.3 kg)   12/28/22 159 lb (72.1 kg)   03/24/22 146 lb 3.2 oz (66.3 kg)     Body mass index is 22.87 kg/m². CBC:   Lab Results   Component Value Date/Time    WBC 5.6 07/07/2021 08:51 AM    RBC 4.31 07/07/2021 08:51 AM    HGB 13.4 07/07/2021 08:51 AM    HCT 39.4 07/07/2021 08:51 AM    MCV 91 07/07/2021 08:51 AM    RDW 12.1 07/07/2021 08:51 AM     07/07/2021 08:51 AM       CMP:   Lab Results   Component Value Date/Time     07/07/2021 08:51 AM    K 3.9 07/07/2021 08:51 AM     07/07/2021 08:51 AM    CO2 22 07/07/2021 08:51 AM    BUN 10 07/07/2021 08:51 AM    CREATININE 0.81 07/07/2021 08:51 AM    GFRAA 118 07/07/2021 08:51 AM    AGRATIO 1.8 07/07/2021 08:51 AM    GLUCOSE 80 07/07/2021 08:51 AM    PROT 6.9 07/07/2021 08:51 AM    CALCIUM 9.2 07/07/2021 08:51 AM    BILITOT 0.7 07/07/2021 08:51 AM    ALKPHOS 57 07/07/2021 08:51 AM    AST 24 07/07/2021 08:51 AM    ALT 12 07/07/2021 08:51 AM       POC Tests: No results for input(s): POCGLU, POCNA, POCK, POCCL, POCBUN, POCHEMO, POCHCT in the last 72 hours. Coags: No results found for: PROTIME, INR, APTT    HCG (If Applicable):   Lab Results   Component Value Date    PREGTESTUR Negative 12/30/2022        ABGs: No results found for: PHART, PO2ART, ADP1PWU, AKX7FKO, BEART, X1KAKCDL     Type & Screen (If Applicable):  No results found for: LABABO, LABRH    Drug/Infectious Status (If Applicable):  No results found for: HIV, HEPCAB    COVID-19 Screening (If Applicable): No results found for: COVID19        Anesthesia Evaluation  Patient summary reviewed and Nursing notes reviewed   history of anesthetic complications: PONV.   Airway: Mallampati: II  TM distance: >3 FB   Neck ROM: full  Mouth opening: > = 3 FB   Dental: normal exam         Pulmonary: breath sounds clear to auscultation  (+) asthma:                            Cardiovascular:  Exercise tolerance: good (>4 METS),           Rhythm: regular  Rate: normal Neuro/Psych:   (+) headaches:,             GI/Hepatic/Renal: Neg GI/Hepatic/Renal ROS            Endo/Other: Negative Endo/Other ROS                    Abdominal:             Vascular: Other Findings:           Anesthesia Plan      general     ASA 2       Induction: intravenous. Anesthetic plan and risks discussed with patient (many family members). Use of blood products discussed with patient whom.                      Aleyda Hernandez MD   12/30/2022

## 2022-12-31 NOTE — OP NOTE
[place]  OPERATIVE REPORT    Name:  Florecita Mcarthur  MR#:  [id number]  :  1995  ACCOUNT #:  [de-identified]  DATE OF SERVICE:  2022    PREOPERATIVE DIAGNOSIS:  Ultrasound proof of right ovarian cyst causing pain. POSTOPERATIVE DIAGNOSIS:  Ultrasound proof of right ovarian cyst causing pain with right ovarian cystectomy. PROCEDURE PERFORMED:  1. Diagnostic laparoscopy. 2.  Right ovarian cystectomy. 3.  Removal of Mirena intrauterine device. SURGEON:  Angeline Enamorado MD    ASSISTANT:  None. ANESTHESIA:  General.    COMPLICATIONS:  None. SPECIMENS REMOVED:  Right ovarian cyst wall. IMPLANTS:  None (removal of Mirena IUD). ESTIMATED BLOOD LOSS:  10 mL. FINDINGS:  A 6-7 cm right ovarian cyst with serosanguineous fluid. No infection. No other pathology seen in the pelvis or the abdomen. PROCEDURE:  After informed consent, the patient was taken to the operating room and given general anesthesia. She was prepped and draped in the usual sterile fashion in the dorsal lithotomy position. A time-out was accomplished. Weighted speculum was placed in the vagina and tenaculum was used to grasp the anterior lip of the cervix. The IUD string was seen and this was removed rather easily intact. This was then discarded, showing no sign of infection. I then dilated the cervix to admit the Wilkinson device. The weighted speculum was removed. The attention was turned to the umbilicus, and this was infiltrated with dilute Marcaine, a #11 blade was used to make a 5 mm incision. Through this, the Veress needle was passed and 3 liters of CO2 were infiltrated. Veress needle was withdrawn and the 5 mm disposable trocar was passed through the umbilical port using the Optiview for visual confirmation. The obturator was now removed, laparoscope was placed through the sleeve and the patient was placed in Trendelenburg. The right ovarian cyst was about 6-7 cm.   A 5 mm port was placed in the right lower quadrant and also in the left lower quadrant under direct visualization. Through these two ports, various instruments were passed for removing tissue and performing surgery. The pictures were taken now of the uterus and the left tube and ovary and the right tube as well as the upper abdomen as these were all normal.  There was no endometriosis. There was no evidence of any infection, no scar tissue. The right ovary was now pulled away from the right ovarian wall, and this was punctured, clear water came forth but there was blood tinged to it. This opening was now widened. There was no growth inside the ovarian cyst cavity. We then began to tediously try to strip away the ovarian cyst wall. This came out in multiple fragments, and there was no single thick specimen. Once all or most of the specimen was removed, there was some bleeding from the raw area. This was irrigated. All the fluid was now removed from the cul-de-sac. I then observed this area, it was again slightly oozing, so the Surgicel powder was placed in the ovary. I then watched this for 5 minutes, there was good hemostasis and so irrigation was carried out with excess powder being removed and irrigated out. There was no damage to the right fallopian tube or any other organs. Once the bleeding was confirmed to have stopped, the case was now terminated. All the ports were removed under direct visualization as the CO2 was allowed to escape. These ports were closed at the skin layer with subcuticular stitch of 4-0 Vicryl. Dermabond was placed over each one of these. The Wilkinson cannula was removed. The patient tolerated the procedure well and went to Recovery in stable condition. She was discharged with pain pills and instructions.       Wendee Duverney, MD      [id number]  D:  12/30/2022 19:45  T:  12/31/2022 5:18  JOB #:  [id number]

## 2023-01-05 ENCOUNTER — PATIENT MESSAGE (OUTPATIENT)
Dept: OBGYN CLINIC | Age: 28
End: 2023-01-05

## 2023-01-05 DIAGNOSIS — Z98.890 POST-OPERATIVE STATE: ICD-10-CM

## 2023-01-05 DIAGNOSIS — Z98.890 POST-OPERATIVE STATE: Primary | ICD-10-CM

## 2023-01-05 RX ORDER — TRAMADOL HYDROCHLORIDE 50 MG/1
50 TABLET ORAL EVERY 6 HOURS PRN
Qty: 15 TABLET | Refills: 0 | Status: SHIPPED | OUTPATIENT
Start: 2023-01-05 | End: 2023-01-06 | Stop reason: SDUPTHER

## 2023-01-05 NOTE — TELEPHONE ENCOUNTER
GYN patient who had surgery on 12/30/22 is calling stating that she is still having some abdominal pain and is out of her pain medication. Pt has been taking Tylenol with minimal relief. Spoke with Dr. Rajesh Vela who states he will send in Tramadol for patient.   Prescription sent to Dr. Rajesh Vela for approval.

## 2023-01-05 NOTE — TELEPHONE ENCOUNTER
Attempted to contact patient with no answer. [facility] notifying patient prescription was sent in to pharmacy. Pt to call back if sxs not improved with Tramadol or other symptoms develop.

## 2023-01-06 ENCOUNTER — TELEPHONE (OUTPATIENT)
Dept: OBGYN CLINIC | Age: 28
End: 2023-01-06

## 2023-01-06 DIAGNOSIS — Z98.890 POST-OPERATIVE STATE: ICD-10-CM

## 2023-01-06 RX ORDER — TRAMADOL HYDROCHLORIDE 50 MG/1
50 TABLET ORAL EVERY 6 HOURS PRN
Qty: 15 TABLET | Refills: 0 | Status: CANCELLED | OUTPATIENT
Start: 2023-01-06 | End: 2023-01-09

## 2023-01-06 RX ORDER — TRAMADOL HYDROCHLORIDE 50 MG/1
50 TABLET ORAL EVERY 6 HOURS PRN
Qty: 15 TABLET | Refills: 0 | Status: SHIPPED | OUTPATIENT
Start: 2023-01-06 | End: 2023-01-09

## 2023-01-06 NOTE — TELEPHONE ENCOUNTER
----- Message from Gregory Hadley RN sent at 1/6/2023  7:40 AM EST -----  Regarding: FW: Pain medication   Can you have Dr. John Oconnell resend patients Ultram to [facility] in [place]. It was sent to the wrong pharmacy yesterday.    ----- Message -----  From: Octavio Bourne  Sent: 1/5/2023   5:09 PM EST  To: , #  Subject: Pain medication                                  It's okay! I tried calling you guys back, but you had already left for the day, and there was no way for me to leave a message. You must cancel and resend the prescription to [facility] in [place], off [address]. Unfortunately, it being a controlled substance, they won't be able to transfer it.

## 2023-01-10 NOTE — PROGRESS NOTES
Annita Garza presents for postop visit from Right Ovarian Cystectomy Laparoscopic/ IUD Removal on 12/30/2022 about 2 weeks ago. Doing well postoperatively. without any bleeding. Fever: NO Voiding well: YES. Bowel movements OK: YES. Pathology results were discussed with patient. \"RIGHT OVARIAN CYST WALL\": FINDINGS CONSISTENT WITH BENIGN HEMORRHAGIC CYST. Ht 5' 10\" (1.778 m)   Wt 158 lb 12.8 oz (72 kg)   BMI 22.79 kg/m²     Exam: A&OX3, NAD. Abdomen:  Non tender Incisions clean, dry, intact X 3    A/P. Stable Post op condition. Gradually increase activity. Resumption of sexual activity is  encouraged at this time. Follow up prn after decision of birth control   This was a post op visit that became a long discussion about birth control options. She has a Hx of a DVT following knee surgery. Has had an IUD in prior to her laparoscopy; this was remove at time of surgery. Now needs birth control but has a Hx of ovarian cysts requiring surgical drainage X 2. Interested in nexplanon. Pamphlet given. Her mother had  hx of DVT as well, so we discussed getting a DVT/coag panel. I have now ordered these and she will come back for that . In the meantime will use condoms.

## 2023-01-11 ENCOUNTER — OFFICE VISIT (OUTPATIENT)
Dept: OBGYN CLINIC | Age: 28
End: 2023-01-11
Payer: COMMERCIAL

## 2023-01-11 VITALS — BODY MASS INDEX: 22.73 KG/M2 | HEIGHT: 70 IN | WEIGHT: 158.8 LBS

## 2023-01-11 DIAGNOSIS — Z30.018 ENCOUNTER FOR INITIAL PRESCRIPTION OF OTHER CONTRACEPTIVES: ICD-10-CM

## 2023-01-11 DIAGNOSIS — Z98.890 POST-OPERATIVE STATE: ICD-10-CM

## 2023-01-11 DIAGNOSIS — I82.4Y9 DEEP VEIN THROMBOSIS (DVT) OF PROXIMAL LOWER EXTREMITY, UNSPECIFIED CHRONICITY, UNSPECIFIED LATERALITY (HCC): Primary | ICD-10-CM

## 2023-01-11 PROCEDURE — 99214 OFFICE O/P EST MOD 30 MIN: CPT | Performed by: OBSTETRICS & GYNECOLOGY

## 2023-01-13 RX ORDER — SUMATRIPTAN 50 MG/1
TABLET, FILM COATED ORAL
Qty: 9 TABLET | Refills: 11 | Status: SHIPPED | OUTPATIENT
Start: 2023-01-13

## 2023-01-13 NOTE — TELEPHONE ENCOUNTER
Per verbal orders by Dr. Rajesh guajardo to send in Imitrex 50mg #9 with 11 refills take one tablet by oral route at onset of migraine and can repeat in 2 hours PRN. Orders Placed This Encounter    SUMAtriptan (IMITREX) 50 MG tablet     Sig: Take one tablet by oral route at onset of migraine. Repeat in 2 hours if needed.      Dispense:  9 tablet     Refill:  11

## 2023-01-17 ENCOUNTER — TELEPHONE (OUTPATIENT)
Dept: OBGYN CLINIC | Age: 28
End: 2023-01-17

## 2023-01-23 ENCOUNTER — OFFICE VISIT (OUTPATIENT)
Dept: GYNECOLOGY | Age: 28
End: 2023-01-23
Payer: COMMERCIAL

## 2023-01-23 VITALS
HEIGHT: 70 IN | SYSTOLIC BLOOD PRESSURE: 122 MMHG | WEIGHT: 157 LBS | DIASTOLIC BLOOD PRESSURE: 82 MMHG | BODY MASS INDEX: 22.48 KG/M2

## 2023-01-23 DIAGNOSIS — R10.2 PELVIC PAIN: Primary | ICD-10-CM

## 2023-01-23 DIAGNOSIS — N83.201 RIGHT OVARIAN CYST: ICD-10-CM

## 2023-01-23 PROCEDURE — 76830 TRANSVAGINAL US NON-OB: CPT | Performed by: OBSTETRICS & GYNECOLOGY

## 2023-01-23 PROCEDURE — 99214 OFFICE O/P EST MOD 30 MIN: CPT | Performed by: OBSTETRICS & GYNECOLOGY

## 2023-01-23 NOTE — PROGRESS NOTES
KAROLINA Fragoso is a 24 y.o. female seen for follow up ovarian cyst.  She had right ovarian cystectomy the last of Dec and has been doing well. Dr. Rajesh Vela has ordered DVT/coag panel to see if she would be a candidate for other birth control since she has had 2 ovarian cysts that required surgery with an IUD. She does have hx of DVT after knee surgery. Her mother has also had DVT. She would be interested in NuvaRing or patch. Past Medical History, Past Surgical History, Family history, Social History, and Medications were all reviewed with the patient today and updated as necessary. Current Outpatient Medications   Medication Sig    omeprazole (PRILOSEC) 40 MG delayed release capsule Take 40 mg by mouth daily    budesonide (PULMICORT) 0.5 MG/2ML nebulizer suspension Indications: ok to substitue. to include rinse bottle. Add 10mL of medication to 240mL of saline. Irrigate 2x daily. Budesonide 0.6mg/10 mL    albuterol sulfate  (90 Base) MCG/ACT inhaler Inhale 2 puffs into the lungs every 4 hours as needed    EPINEPHrine (EPIPEN) 0.3 MG/0.3ML SOAJ injection INJECT INTO OUTER THIGH FOR SEVERE ALLERGIC REACTION. CALL 911 AFTER USE. fexofenadine (ALLEGRA) 180 MG tablet Take 180 mg by mouth daily     No current facility-administered medications for this visit.      Allergies   Allergen Reactions    Acetazolamide Rash    Cherry Anaphylaxis     Cherry     Sulfa Antibiotics Anaphylaxis    Nickel Rash     Past Medical History:   Diagnosis Date    Allergy     Asthma     DVT of axillary vein, acute left ([address]     2014-after knee sugery    Neurological disorder     migraine    PONV (postoperative nausea and vomiting)     URI (upper respiratory infection)      Past Surgical History:   Procedure Laterality Date    ADENOIDECTOMY      GYN  03/02/2022    Diagnostic laparoscopy.; RIGHT OVARIAN CYST WALL\":     HEENT      sinus    ORTHOPEDIC SURGERY Bilateral     knee surgeries    OVARIAN CYST REMOVAL Right 2022    RIGHT OVARIAN CYSTECTOMY LAPAROSCOPIC/ IUD REMOVAL performed by Karen Pierre MD at [facility]     Family History   Problem Relation Age of Onset    Colon Cancer Neg Hx     Breast Cancer Neg Hx     Rheum Arthritis Mother       Social History     Tobacco Use    Smoking status: Never    Smokeless tobacco: Never   Substance Use Topics    Alcohol use: Not Currently       Social History     Substance and Sexual Activity   Sexual Activity Yes    Partners: Male    Birth control/protection: Condom Male     OB History    Para Term  AB Living   2 0 0 0 2 0   SAB IAB Ectopic Molar Multiple Live Births   0 0 0 0 0 0      # Outcome Date GA Lbr Lino/2nd Weight Sex Delivery Anes PTL Lv   2 AB            1 AB                Health Maintenance      ROS:    Review of Systems  General: Not Present- Chills, Fever, Fatigue, Insomnia, Hot flashes/Night sweats, Weight gain  Skin: Not Present- Bruising, Change in Wart/Mole, Excessive Sweating, Itching, Nail Changes, New Lesions, Rash, Skin Color Changes and Ulcer. HEENT: Not Present- Headache, Blurred Vision, Double Vision, Glaucoma, Visual Disturbances, Hearing Loss, Ringing in the Ears, Vertigo, Nose Bleed, Bleeding Gums, Hoarseness and Sore Throat. Neck: Not Present- Neck Pain and Neck Swelling. Respiratory: Not Present- Cough, Difficulty Breathing and Difficulty Breathing on Exertion. Breast: Not Present- Breast Mass, Breast Pain, Breast Swelling, Nipple Discharge, Nipple Pain, Recent Breast Size Changes and Skin Changes. Cardiovascular: Not Present- Abnormal Blood Pressure, Chest Pain, Edema, Fainting / Blacking Out, Palpitations, Shortness of Breath and Swelling of Extremities. Gastrointestinal: Not Present- Abdominal Pain, Abdominal Swelling, Bloating, Change in Bowel Habits, Constipation, Diarrhea, Difficulty Swallowing, Gets full quickly at meals, Nausea, Rectal Bleeding and Vomiting.   Female Genitourinary: Not Present- Dysmenorrhea, Dyspareunia, Decreased libido, Excessive Menstrual Bleeding, Menstrual Irregularities, Pelvic Pain, Urinary Complaints, Vaginal Discharge, Vaginal itching/burning, Vaginal odor  Musculoskeletal: Not Present- Joint Pain and Muscle Pain. Neurological: Not Present- Dizziness, Fainting, Headaches and Seizures. Psychiatric: Not Present- Anxiety, Depression, Mood changes and Panic Attacks. Endocrine: Not Present- Appetite Changes, Cold Intolerance, Excessive Thirst, Excessive Urination and Heat Intolerance. Hematology: Not Present- Abnormal Bleeding, Easy Bruising and Enlarged Lymph Nodes. PHYSICAL EXAM:    /82 (Position: Sitting)   Ht 5' 10\" (1.778 m)   Wt 157 lb (71.2 kg)   LMP 01/02/2023   BMI 22.53 kg/m²     Constitutional: Vital signs are normal. She appears well-developed and well-nourished. She is active and cooperative. Neurological: She is alert. Nursing note and vitals reviewed. Medical problems and test results were reviewed with the patient today. ASSESSMENT and PLAN    1. Pelvic pain  -     US NON OB TRANSVAGINAL  2. Right ovarian cyst       Comment   [id number]----tv pelvic pain/follow up previous right cyst   HISTORY: Surgery for right ovarian cyst in December with [facility]. IUD removed at time of surgery. No recent   pain until Saturday when she ran into the corner of a table on her left and she has noticed left pelvic pain now. COMPARISON: Ultrasound 12/28/22----rt hemorrhagic cyst that measured 7.2/3.7/5.0cm and IUD   ---------------------------------------------------------------------------------------------------------------   Enlarged uterus = 6wks   Endometrium = 6.2mm   Rt ovary is normal.   Lt ovary is normal.   No free fluid noted in the pelvis. Date: 01/23/2023 Perf. Physician: Dr. Ulysses Brighter, MD Sonographer: Yaniv Fierro, [address] [place] [address] done in my office and discussed with patient. Reassured.   She will follow up with Dr. Rajesh Vela about the DVT/coag panel and decide on birth control at that time. Time:  I spent  30 minutes in preparing to see patient (including chart review and preparation), obtaining and/or reviewing additional medical history, performing a physical exam and evaluation, documenting clinical information in the electronic health record, independently interpreting results, communicating results to patient, family or caregiver, and/or coordinating care. No follow-ups on file.        Cheryl Toledo MD

## 2023-03-10 ENCOUNTER — TELEPHONE (OUTPATIENT)
Dept: OBGYN CLINIC | Age: 28
End: 2023-03-10

## 2023-03-10 NOTE — TELEPHONE ENCOUNTER
----- Message from Michael Acosta MD sent at 3/8/2023  4:46 PM EST -----  Regarding: dvt blood clot tests  All of her blood clot tests were negative, so can come in and talk about birth control options if she wants  ----- Message -----  From: Chalo Espinoza Incoming [place] [employer]  Sent: 2/24/2023   2:40 PM EST  To: Michael Acosta MD

## 2023-03-14 ENCOUNTER — OFFICE VISIT (OUTPATIENT)
Dept: OBGYN CLINIC | Age: 28
End: 2023-03-14
Payer: COMMERCIAL

## 2023-03-14 VITALS
WEIGHT: 156.2 LBS | SYSTOLIC BLOOD PRESSURE: 98 MMHG | BODY MASS INDEX: 22.36 KG/M2 | DIASTOLIC BLOOD PRESSURE: 60 MMHG | HEIGHT: 70 IN

## 2023-03-14 DIAGNOSIS — Z30.015 ENCOUNTER FOR INITIAL PRESCRIPTION OF VAGINAL RING HORMONAL CONTRACEPTIVE: Primary | ICD-10-CM

## 2023-03-14 PROCEDURE — 99214 OFFICE O/P EST MOD 30 MIN: CPT | Performed by: OBSTETRICS & GYNECOLOGY

## 2023-03-14 RX ORDER — ETONOGESTREL AND ETHINYL ESTRADIOL 11.7; 2.7 MG/1; MG/1
1 INSERT, EXTENDED RELEASE VAGINAL
Qty: 3 EACH | Refills: 5 | Status: SHIPPED | OUTPATIENT
Start: 2023-03-14

## 2023-03-14 NOTE — PROGRESS NOTES
The patient is a 24 y.o. Nas Manrique who is seen talk to discuss birth control options. Is leaning toward Nuvaring. HISTORY:    [id number]  Patient's last menstrual period was 02/22/2023 (approximate). Sexual History:  has sex with males  Contraception:  none  Current Outpatient Medications on File Prior to Visit   Medication Sig Dispense Refill    omeprazole (PRILOSEC) 40 MG delayed release capsule Take 40 mg by mouth daily      budesonide (PULMICORT) 0.5 MG/2ML nebulizer suspension Indications: ok to substitue. to include rinse bottle. Add 10mL of medication to 240mL of saline. Irrigate 2x daily. Budesonide 0.6mg/10 mL      albuterol sulfate  (90 Base) MCG/ACT inhaler Inhale 2 puffs into the lungs every 4 hours as needed      fexofenadine (ALLEGRA) 180 MG tablet Take 180 mg by mouth daily      EPINEPHrine (EPIPEN) 0.3 MG/0.3ML SOAJ injection INJECT INTO OUTER THIGH FOR SEVERE ALLERGIC REACTION. CALL 911 AFTER USE. (Patient not taking: Reported on 3/14/2023)       No current facility-administered medications on file prior to visit. ROS:  Feeling well. No dyspnea or chest pain on exertion. No abdominal pain, change in bowel habits, black or bloody stools. No urinary tract symptoms. GYN ROS: normal menses, no abnormal bleeding, pelvic pain or discharge, no breast pain or new or enlarging lumps on self exam.    PHYSICAL EXAM:  Blood pressure 98/60, height 5' 10\" (1.778 m), weight 156 lb 3.2 oz (70.9 kg), last menstrual period 02/22/2023. The patient appears well, alert, oriented x 3, in no distress. ASSESSMENT:  Desire for birth control, has previously been on Nuvaring years ago. Had a blood clot while on this but was associated with knee surgery. No feelings that she has had a return of cyst ( had surgery 2 months ago)  PLAN:  All questions answered.  Start ring first day  Blood tests: I reviewed all of her coagulation studies and these were normal  Diagnosis explained in detail, including differential  Follow up in 1 year

## 2023-12-10 NOTE — PROGRESS NOTES
Desmond Mcpherson is a 34 y.o. female who presents today for the following:  Chief Complaint   Patient presents with    Migraine     Reports migraines 1-2 times per week       Allergies   Allergen Reactions    Acetazolamide Rash    Cherry Anaphylaxis     Cherry     Sulfa Antibiotics Anaphylaxis    Nickel Rash       Current Outpatient Medications   Medication Sig Dispense Refill    norelgestromin-ethinyl estradiol Drea Pun) 150-35 MCG/24HR Place 1 patch onto the skin once a week For 3 weeks then off for 1 week. 9 patch 4    etonogestrel-ethinyl estradiol (NUVARING) 0.12-0.015 MG/24HR vaginal ring Place 1 each vaginally every 21 days Insert one (1) ring vaginally and leave in place for three (3) weeks, then remove for one (1) week. 3 each 5    omeprazole (PRILOSEC) 40 MG delayed release capsule Take 1 capsule by mouth daily      budesonide (PULMICORT) 0.5 MG/2ML nebulizer suspension Indications: ok to substitue. to include rinse bottle. Add 10mL of medication to 240mL of saline. Irrigate 2x daily. Budesonide 0.6mg/10 mL      albuterol sulfate  (90 Base) MCG/ACT inhaler Inhale 2 puffs into the lungs every 4 hours as needed      EPINEPHrine (EPIPEN) 0.3 MG/0.3ML SOAJ injection INJECT INTO OUTER THIGH FOR SEVERE ALLERGIC REACTION. CALL 911 AFTER USE. (Patient not taking: Reported on 3/14/2023)      fexofenadine (ALLEGRA) 180 MG tablet Take 1 tablet by mouth daily       No current facility-administered medications for this visit.        Past Medical History:   Diagnosis Date    ADHD (attention deficit hyperactivity disorder)     Allergy     Asthma     DVT of axillary vein, acute left ([address])     2014-after knee sugery    Headache     Neurological disorder     migraine    PONV (postoperative nausea and vomiting)     URI (upper respiratory infection)        Past Surgical History:   Procedure Laterality Date    ADENOIDECTOMY      GYN  03/02/2022    Diagnostic laparoscopy.; RIGHT OVARIAN CYST WALL\":     HEENT      sinus

## 2023-12-12 ENCOUNTER — PROCEDURE VISIT (OUTPATIENT)
Dept: OBGYN CLINIC | Age: 28
End: 2023-12-12
Payer: COMMERCIAL

## 2023-12-12 VITALS — SYSTOLIC BLOOD PRESSURE: 100 MMHG | BODY MASS INDEX: 22.96 KG/M2 | WEIGHT: 160 LBS | DIASTOLIC BLOOD PRESSURE: 72 MMHG

## 2023-12-12 DIAGNOSIS — N92.6 IRREGULAR BLEEDING: ICD-10-CM

## 2023-12-12 DIAGNOSIS — R10.2 PELVIC PAIN: Primary | ICD-10-CM

## 2023-12-12 DIAGNOSIS — Z12.4 SCREENING FOR MALIGNANT NEOPLASM OF CERVIX: ICD-10-CM

## 2023-12-12 PROCEDURE — 99214 OFFICE O/P EST MOD 30 MIN: CPT | Performed by: OBSTETRICS & GYNECOLOGY

## 2023-12-12 PROCEDURE — 76830 TRANSVAGINAL US NON-OB: CPT | Performed by: OBSTETRICS & GYNECOLOGY

## 2023-12-12 NOTE — PROGRESS NOTES
KAROLINA Glover is a 34 y.o. female seen for spotting all the time. She is on the continuous NuvaRing. Since she had her surgery in 2022 for ovarian cyst and starting the NuvaRing she has had painful intercourse and pain with insertion of a tampon. She did take the NuvaRing out one time during intercourse and the pain was much better. Past Medical History, Past Surgical History, Family history, Social History, and Medications were all reviewed with the patient today and updated as necessary. Current Outpatient Medications   Medication Sig    norelgestromin-ethinyl estradiol Minal Cinnamon) 150-35 MCG/24HR Place 1 patch onto the skin once a week For 3 weeks then off for 1 week.    etonogestrel-ethinyl estradiol (NUVARING) 0.12-0.015 MG/24HR vaginal ring Place 1 each vaginally every 21 days Insert one (1) ring vaginally and leave in place for three (3) weeks, then remove for one (1) week. omeprazole (PRILOSEC) 40 MG delayed release capsule Take 1 capsule by mouth daily    budesonide (PULMICORT) 0.5 MG/2ML nebulizer suspension Indications: ok to substitue. to include rinse bottle. Add 10mL of medication to 240mL of saline. Irrigate 2x daily. Budesonide 0.6mg/10 mL    albuterol sulfate  (90 Base) MCG/ACT inhaler Inhale 2 puffs into the lungs every 4 hours as needed    fexofenadine (ALLEGRA) 180 MG tablet Take 1 tablet by mouth daily    EPINEPHrine (EPIPEN) 0.3 MG/0.3ML SOAJ injection INJECT INTO OUTER THIGH FOR SEVERE ALLERGIC REACTION. CALL 911 AFTER USE. (Patient not taking: Reported on 3/14/2023)     No current facility-administered medications for this visit.      Allergies   Allergen Reactions    Acetazolamide Rash    Cherry Anaphylaxis     Cherry     Sulfa Antibiotics Anaphylaxis    Nickel Rash     Past Medical History:   Diagnosis Date    Allergy     Asthma     DVT of axillary vein, acute left ([address])     2014-after knee sugery    Neurological disorder     migraine    PONV (postoperative

## 2023-12-13 ENCOUNTER — OFFICE VISIT (OUTPATIENT)
Dept: FAMILY MEDICINE CLINIC | Facility: CLINIC | Age: 28
End: 2023-12-13
Payer: COMMERCIAL

## 2023-12-13 VITALS
HEART RATE: 66 BPM | HEIGHT: 70 IN | OXYGEN SATURATION: 99 % | SYSTOLIC BLOOD PRESSURE: 112 MMHG | BODY MASS INDEX: 23.34 KG/M2 | TEMPERATURE: 97.8 F | DIASTOLIC BLOOD PRESSURE: 60 MMHG | WEIGHT: 163 LBS

## 2023-12-13 DIAGNOSIS — G43.109 MIGRAINE WITH AURA AND WITHOUT STATUS MIGRAINOSUS, NOT INTRACTABLE: Primary | ICD-10-CM

## 2023-12-13 DIAGNOSIS — G43.109 MIGRAINE WITH AURA AND WITHOUT STATUS MIGRAINOSUS, NOT INTRACTABLE: ICD-10-CM

## 2023-12-13 DIAGNOSIS — F90.9 ATTENTION DEFICIT HYPERACTIVITY DISORDER (ADHD), UNSPECIFIED ADHD TYPE: ICD-10-CM

## 2023-12-13 LAB
ALBUMIN SERPL-MCNC: 3.8 G/DL (ref 3.5–5)
ALBUMIN/GLOB SERPL: 1.2 (ref 0.4–1.6)
ALP SERPL-CCNC: 45 U/L (ref 50–136)
ALT SERPL-CCNC: 25 U/L (ref 12–65)
ANION GAP SERPL CALC-SCNC: 6 MMOL/L (ref 2–11)
AST SERPL-CCNC: 15 U/L (ref 15–37)
BASOPHILS # BLD: 0 K/UL (ref 0–0.2)
BASOPHILS NFR BLD: 0 % (ref 0–2)
BILIRUB SERPL-MCNC: 0.3 MG/DL (ref 0.2–1.1)
BUN SERPL-MCNC: 16 MG/DL (ref 6–23)
CALCIUM SERPL-MCNC: 8.8 MG/DL (ref 8.3–10.4)
CHLORIDE SERPL-SCNC: 112 MMOL/L (ref 103–113)
CO2 SERPL-SCNC: 23 MMOL/L (ref 21–32)
CREAT SERPL-MCNC: 0.9 MG/DL (ref 0.6–1)
DIFFERENTIAL METHOD BLD: NORMAL
EOSINOPHIL # BLD: 0.1 K/UL (ref 0–0.8)
EOSINOPHIL NFR BLD: 2 % (ref 0.5–7.8)
ERYTHROCYTE [DISTWIDTH] IN BLOOD BY AUTOMATED COUNT: 12.3 % (ref 11.9–14.6)
GLOBULIN SER CALC-MCNC: 3.2 G/DL (ref 2.8–4.5)
GLUCOSE SERPL-MCNC: 106 MG/DL (ref 65–100)
HCT VFR BLD AUTO: 42.1 % (ref 35.8–46.3)
HGB BLD-MCNC: 14.1 G/DL (ref 11.7–15.4)
IMM GRANULOCYTES # BLD AUTO: 0 K/UL (ref 0–0.5)
IMM GRANULOCYTES NFR BLD AUTO: 0 % (ref 0–5)
LYMPHOCYTES # BLD: 1.4 K/UL (ref 0.5–4.6)
LYMPHOCYTES NFR BLD: 23 % (ref 13–44)
MCH RBC QN AUTO: 30.5 PG (ref 26.1–32.9)
MCHC RBC AUTO-ENTMCNC: 33.5 G/DL (ref 31.4–35)
MCV RBC AUTO: 90.9 FL (ref 82–102)
MONOCYTES # BLD: 0.4 K/UL (ref 0.1–1.3)
MONOCYTES NFR BLD: 7 % (ref 4–12)
NEUTS SEG # BLD: 4.1 K/UL (ref 1.7–8.2)
NEUTS SEG NFR BLD: 68 % (ref 43–78)
NRBC # BLD: 0 K/UL (ref 0–0.2)
PLATELET # BLD AUTO: 374 K/UL (ref 150–450)
PMV BLD AUTO: 9.6 FL (ref 9.4–12.3)
POTASSIUM SERPL-SCNC: 4.2 MMOL/L (ref 3.5–5.1)
PROT SERPL-MCNC: 7 G/DL (ref 6.3–8.2)
RBC # BLD AUTO: 4.63 M/UL (ref 4.05–5.2)
SODIUM SERPL-SCNC: 141 MMOL/L (ref 136–146)
TSH W FREE THYROID IF ABNORMAL: 1.43 UIU/ML (ref 0.36–3.74)
WBC # BLD AUTO: 6.1 K/UL (ref 4.3–11.1)

## 2023-12-13 PROCEDURE — 99214 OFFICE O/P EST MOD 30 MIN: CPT | Performed by: FAMILY MEDICINE

## 2023-12-13 RX ORDER — PROMETHAZINE HYDROCHLORIDE 25 MG/1
25 TABLET ORAL EVERY 8 HOURS PRN
Qty: 30 TABLET | Refills: 0 | Status: SHIPPED | OUTPATIENT
Start: 2023-12-13

## 2023-12-13 RX ORDER — RIZATRIPTAN BENZOATE 10 MG/1
10 TABLET ORAL DAILY PRN
Qty: 12 TABLET | Refills: 3 | Status: SHIPPED | OUTPATIENT
Start: 2023-12-13

## 2023-12-13 RX ORDER — AMITRIPTYLINE HYDROCHLORIDE 10 MG/1
10 TABLET, FILM COATED ORAL NIGHTLY
Qty: 90 TABLET | Refills: 1 | Status: SHIPPED | OUTPATIENT
Start: 2023-12-13

## 2023-12-13 SDOH — ECONOMIC STABILITY: HOUSING INSECURITY
IN THE LAST 12 MONTHS, WAS THERE A TIME WHEN YOU DID NOT HAVE A STEADY PLACE TO SLEEP OR SLEPT IN A SHELTER (INCLUDING NOW)?: NO

## 2023-12-13 SDOH — ECONOMIC STABILITY: FOOD INSECURITY: WITHIN THE PAST 12 MONTHS, YOU WORRIED THAT YOUR FOOD WOULD RUN OUT BEFORE YOU GOT MONEY TO BUY MORE.: NEVER TRUE

## 2023-12-13 SDOH — ECONOMIC STABILITY: INCOME INSECURITY: HOW HARD IS IT FOR YOU TO PAY FOR THE VERY BASICS LIKE FOOD, HOUSING, MEDICAL CARE, AND HEATING?: PATIENT DECLINED

## 2023-12-13 SDOH — ECONOMIC STABILITY: FOOD INSECURITY: WITHIN THE PAST 12 MONTHS, THE FOOD YOU BOUGHT JUST DIDN'T LAST AND YOU DIDN'T HAVE MONEY TO GET MORE.: NEVER TRUE

## 2023-12-13 SDOH — ECONOMIC STABILITY: TRANSPORTATION INSECURITY
IN THE PAST 12 MONTHS, HAS LACK OF TRANSPORTATION KEPT YOU FROM MEETINGS, WORK, OR FROM GETTING THINGS NEEDED FOR DAILY LIVING?: NO

## 2023-12-13 ASSESSMENT — PATIENT HEALTH QUESTIONNAIRE - PHQ9
2. FEELING DOWN, DEPRESSED OR HOPELESS: NOT AT ALL
SUM OF ALL RESPONSES TO PHQ9 QUESTIONS 1 & 2: 0
2. FEELING DOWN, DEPRESSED OR HOPELESS: 0
SUM OF ALL RESPONSES TO PHQ9 QUESTIONS 1 & 2: 0
1. LITTLE INTEREST OR PLEASURE IN DOING THINGS: 0
SUM OF ALL RESPONSES TO PHQ QUESTIONS 1-9: 0
1. LITTLE INTEREST OR PLEASURE IN DOING THINGS: NOT AT ALL

## 2023-12-13 ASSESSMENT — ENCOUNTER SYMPTOMS
WHEEZING: 0
SHORTNESS OF BREATH: 0
ABDOMINAL PAIN: 0

## 2023-12-14 LAB
COLLECTION METHOD: NORMAL
CYTOLOGIST CVX/VAG CYTO: NORMAL
CYTOLOGY CVX/VAG DOC THIN PREP: NORMAL
DATE OF LMP: NORMAL
HPV REFLEX: NORMAL
Lab: NORMAL
OTHER PT INFO: NORMAL
PAP SOURCE: NORMAL
PATH REPORT.FINAL DX SPEC: NORMAL
PREV CYTO INFO: NORMAL
PREV TREATMENT RESULTS: NORMAL
PREV TREATMENT: NORMAL
STAT OF ADQ CVX/VAG CYTO-IMP: NORMAL

## 2023-12-26 ENCOUNTER — HOSPITAL ENCOUNTER (OUTPATIENT)
Dept: MRI IMAGING | Age: 28
Discharge: HOME | End: 2023-12-29
Attending: FAMILY MEDICINE
Payer: COMMERCIAL

## 2023-12-26 DIAGNOSIS — G43.109 MIGRAINE WITH AURA AND WITHOUT STATUS MIGRAINOSUS, NOT INTRACTABLE: ICD-10-CM

## 2023-12-26 PROCEDURE — 70551 MRI BRAIN STEM W/O DYE: CPT

## 2024-01-03 ENCOUNTER — TELEPHONE (OUTPATIENT)
Dept: FAMILY MEDICINE CLINIC | Facility: CLINIC | Age: 29
End: 2024-01-03

## 2024-01-03 NOTE — TELEPHONE ENCOUNTER
Received call from patient, who states that yesterday morning when she woke up, she went into a coughing fit. States that she had to use her Albuterol inhaler twice in order to stop the cough. States that when she took a deep breath, her chest hurt.   This morning, she reports it feels as though someone has punched her in the chest, stating that it is sore when she breaths in deep. Says that it is not difficult to breath, just painful.   Patient reports she has asthma & usually uses inhaler during exercise or a bad allergy season.

## 2024-01-03 NOTE — TELEPHONE ENCOUNTER
Returned patient's call, notifying of Dr. Browne's response. Patient states that she was seen in the ER at [facility] d/t the pain getting worse. States that they did a CXR & lab work, which were not necessarily concerning & they discharged her. Patient would still like to see Dr. Browne. Appt made for tomorrow, 1/4 at 11am.

## 2024-01-04 ENCOUNTER — OFFICE VISIT (OUTPATIENT)
Dept: FAMILY MEDICINE CLINIC | Facility: CLINIC | Age: 29
End: 2024-01-04
Payer: COMMERCIAL

## 2024-01-04 VITALS
WEIGHT: 167 LBS | SYSTOLIC BLOOD PRESSURE: 122 MMHG | OXYGEN SATURATION: 98 % | HEIGHT: 70 IN | DIASTOLIC BLOOD PRESSURE: 78 MMHG | TEMPERATURE: 98 F | HEART RATE: 104 BPM | BODY MASS INDEX: 23.91 KG/M2

## 2024-01-04 DIAGNOSIS — R05.1 ACUTE COUGH: ICD-10-CM

## 2024-01-04 DIAGNOSIS — D72.829 LEUKOCYTOSIS, UNSPECIFIED TYPE: ICD-10-CM

## 2024-01-04 DIAGNOSIS — G43.109 MIGRAINE WITH AURA AND WITHOUT STATUS MIGRAINOSUS, NOT INTRACTABLE: ICD-10-CM

## 2024-01-04 DIAGNOSIS — M94.0 COSTOCHONDRITIS: ICD-10-CM

## 2024-01-04 DIAGNOSIS — J45.20 INTERMITTENT ASTHMA WITHOUT COMPLICATION, UNSPECIFIED ASTHMA SEVERITY: Primary | ICD-10-CM

## 2024-01-04 LAB
BASOPHILS # BLD: 0 K/UL (ref 0–0.2)
BASOPHILS NFR BLD: 0 % (ref 0–2)
DIFFERENTIAL METHOD BLD: ABNORMAL
EOSINOPHIL # BLD: 0.1 K/UL (ref 0–0.8)
EOSINOPHIL NFR BLD: 0 % (ref 0.5–7.8)
ERYTHROCYTE [DISTWIDTH] IN BLOOD BY AUTOMATED COUNT: 12.3 % (ref 11.9–14.6)
HCT VFR BLD AUTO: 40.8 % (ref 35.8–46.3)
HGB BLD-MCNC: 13.3 G/DL (ref 11.7–15.4)
IMM GRANULOCYTES # BLD AUTO: 0.1 K/UL (ref 0–0.5)
IMM GRANULOCYTES NFR BLD AUTO: 0 % (ref 0–5)
LYMPHOCYTES # BLD: 1 K/UL (ref 0.5–4.6)
LYMPHOCYTES NFR BLD: 5 % (ref 13–44)
MCH RBC QN AUTO: 30.5 PG (ref 26.1–32.9)
MCHC RBC AUTO-ENTMCNC: 32.6 G/DL (ref 31.4–35)
MCV RBC AUTO: 93.6 FL (ref 82–102)
MONOCYTES # BLD: 0.9 K/UL (ref 0.1–1.3)
MONOCYTES NFR BLD: 4 % (ref 4–12)
NEUTS SEG # BLD: 17.6 K/UL (ref 1.7–8.2)
NEUTS SEG NFR BLD: 91 % (ref 43–78)
NRBC # BLD: 0 K/UL (ref 0–0.2)
PLATELET # BLD AUTO: 328 K/UL (ref 150–450)
PMV BLD AUTO: 9.4 FL (ref 9.4–12.3)
RBC # BLD AUTO: 4.36 M/UL (ref 4.05–5.2)
WBC # BLD AUTO: 19.7 K/UL (ref 4.3–11.1)

## 2024-01-04 PROCEDURE — 99214 OFFICE O/P EST MOD 30 MIN: CPT | Performed by: FAMILY MEDICINE

## 2024-01-04 RX ORDER — METHYLPHENIDATE HYDROCHLORIDE 18 MG/1
18 TABLET ORAL EVERY MORNING
COMMUNITY
Start: 2023-12-21

## 2024-01-04 RX ORDER — CLONAZEPAM 0.5 MG/1
0.5 TABLET ORAL 2 TIMES DAILY PRN
COMMUNITY

## 2024-01-04 ASSESSMENT — ENCOUNTER SYMPTOMS
WHEEZING: 0
COUGH: 1
CHEST TIGHTNESS: 1
SHORTNESS OF BREATH: 1

## 2024-01-04 ASSESSMENT — PATIENT HEALTH QUESTIONNAIRE - PHQ9
SUM OF ALL RESPONSES TO PHQ QUESTIONS 1-9: 0
SUM OF ALL RESPONSES TO PHQ9 QUESTIONS 1 & 2: 0
SUM OF ALL RESPONSES TO PHQ QUESTIONS 1-9: 0
SUM OF ALL RESPONSES TO PHQ QUESTIONS 1-9: 0
2. FEELING DOWN, DEPRESSED OR HOPELESS: 0
1. LITTLE INTEREST OR PLEASURE IN DOING THINGS: 0
SUM OF ALL RESPONSES TO PHQ QUESTIONS 1-9: 0

## 2024-01-04 NOTE — ASSESSMENT & PLAN NOTE
-normal brain MRI 01/2024  -relief with maxalt for abortive therapy  -never started amitriptyline for preventive therapy  -no established with psychiatrist

## 2024-01-04 NOTE — PROGRESS NOTES
Boogie Hidalgo is a 28 y.o. female who presents today for the following:  Chief Complaint   Patient presents with    Cough     Heavy coughing x3 days (causing chest pain); using inhaler more frequently; seen at [facility] ER yesterday       Allergies   Allergen Reactions    Acetazolamide Rash    Cherry Anaphylaxis     Cherry     Sulfa Antibiotics Anaphylaxis    Nickel Rash       Current Outpatient Medications   Medication Sig Dispense Refill    clonazePAM (KLONOPIN) 0.5 MG tablet Take 1 tablet by mouth 2 times daily as needed for Anxiety. Uses at bedtime Max Daily Amount: 1 mg      rizatriptan (MAXALT) 10 MG tablet Take 1 tablet by mouth daily as needed for Migraine May repeat in 2 hours if needed 12 tablet 3    promethazine (PHENERGAN) 25 MG tablet Take 1 tablet by mouth every 8 hours as needed for Nausea (and migraine) 30 tablet 0    amitriptyline (ELAVIL) 10 MG tablet Take 1 tablet by mouth nightly 90 tablet 1    norelgestromin-ethinyl estradiol (XULANE) 150-35 MCG/24HR Place 1 patch onto the skin once a week For 3 weeks then off for 1 week. 9 patch 4    etonogestrel-ethinyl estradiol (NUVARING) 0.12-0.015 MG/24HR vaginal ring Place 1 each vaginally every 21 days Insert one (1) ring vaginally and leave in place for three (3) weeks, then remove for one (1) week. 3 each 5    omeprazole (PRILOSEC) 40 MG delayed release capsule Take 1 capsule by mouth daily      budesonide (PULMICORT) 0.5 MG/2ML nebulizer suspension Indications: ok to substitue. to include rinse bottle. Add 10mL of medication to 240mL of saline. Irrigate 2x daily. Budesonide 0.6mg/10 mL      albuterol sulfate  (90 Base) MCG/ACT inhaler Inhale 2 puffs into the lungs every 4 hours as needed      EPINEPHrine (EPIPEN) 0.3 MG/0.3ML SOAJ injection INJECT INTO OUTER THIGH FOR SEVERE ALLERGIC REACTION. CALL 911 AFTER USE. (Patient not taking: Reported on 3/14/2023)      fexofenadine (ALLEGRA) 180 MG tablet Take 1 tablet by mouth daily       No current

## 2024-01-23 NOTE — PROGRESS NOTES
2-3 days.  Vision will get blurry with headaches.  No prior preventive medications for migraines. Already taking b6 and magnesium to try to help with migraine prevention.  Does reports poor sleep.  Wakes up frequently through out the night.  Prescribed amitriptyline.  Has noted decrease in headache intensity and frequency. Does report that maxalt has helped with abortive therapy.    -ovarian cysts: previously on nuvaring.  Now on patch.    -history of blood clot: mother has history of blood clots.  Patient had one after surgery.    -ADHD/Anxiety: now established with psychiatry.  Currently taking klonopin and concerta.    Specialists:  Gynecology  Dr. Jenkins, Psychiatry      Contraception: combined contraception  Menses: irregular but has been using continuous combined contraception             Review of Systems   Constitutional:  Negative for chills, fatigue and unexpected weight change.   HENT:  Positive for congestion, sinus pressure, sinus pain and sore throat.    Eyes:  Negative for pain.   Respiratory:  Negative for chest tightness, shortness of breath and wheezing.         /72   Pulse 68   Temp 97.9 °F (36.6 °C)   Wt 74.8 kg (165 lb)   SpO2 99%   BMI 23.68 kg/m²     Physical Exam  Vitals reviewed.   Constitutional:       Appearance: Normal appearance.   HENT:      Right Ear: Tympanic membrane normal.      Left Ear: Tympanic membrane normal.      Nose: Congestion and rhinorrhea present.      Mouth/Throat:      Pharynx: No oropharyngeal exudate or posterior oropharyngeal erythema.   Eyes:      General: No scleral icterus.     Conjunctiva/sclera: Conjunctivae normal.   Cardiovascular:      Rate and Rhythm: Normal rate and regular rhythm.      Heart sounds: Normal heart sounds. No murmur heard.  Pulmonary:      Effort: Pulmonary effort is normal.      Breath sounds: Normal breath sounds.   Musculoskeletal:      Cervical back: Neck supple.   Neurological:      General: No focal deficit present.

## 2024-01-25 ENCOUNTER — OFFICE VISIT (OUTPATIENT)
Dept: FAMILY MEDICINE CLINIC | Facility: CLINIC | Age: 29
End: 2024-01-25
Payer: COMMERCIAL

## 2024-01-25 VITALS
DIASTOLIC BLOOD PRESSURE: 72 MMHG | HEART RATE: 68 BPM | TEMPERATURE: 97.9 F | WEIGHT: 165 LBS | SYSTOLIC BLOOD PRESSURE: 112 MMHG | BODY MASS INDEX: 23.68 KG/M2 | OXYGEN SATURATION: 99 %

## 2024-01-25 DIAGNOSIS — D72.829 LEUKOCYTOSIS, UNSPECIFIED TYPE: Primary | ICD-10-CM

## 2024-01-25 DIAGNOSIS — G43.109 MIGRAINE WITH AURA AND WITHOUT STATUS MIGRAINOSUS, NOT INTRACTABLE: ICD-10-CM

## 2024-01-25 DIAGNOSIS — J01.90 ACUTE NON-RECURRENT SINUSITIS, UNSPECIFIED LOCATION: ICD-10-CM

## 2024-01-25 PROCEDURE — 99214 OFFICE O/P EST MOD 30 MIN: CPT | Performed by: FAMILY MEDICINE

## 2024-01-25 RX ORDER — FLUCONAZOLE 150 MG/1
TABLET ORAL
Qty: 2 TABLET | Refills: 0 | Status: SHIPPED | OUTPATIENT
Start: 2024-01-25

## 2024-01-25 RX ORDER — AMOXICILLIN AND CLAVULANATE POTASSIUM 875; 125 MG/1; MG/1
1 TABLET, FILM COATED ORAL 2 TIMES DAILY
Qty: 20 TABLET | Refills: 0 | Status: SHIPPED | OUTPATIENT
Start: 2024-01-25 | End: 2024-02-04

## 2024-01-25 RX ORDER — AMITRIPTYLINE HYDROCHLORIDE 10 MG/1
10 TABLET, FILM COATED ORAL NIGHTLY
Qty: 90 TABLET | Refills: 1 | Status: SHIPPED | OUTPATIENT
Start: 2024-01-25

## 2024-01-25 RX ORDER — RIZATRIPTAN BENZOATE 10 MG/1
10 TABLET ORAL DAILY PRN
Qty: 12 TABLET | Refills: 3 | Status: SHIPPED | OUTPATIENT
Start: 2024-01-25

## 2024-01-25 ASSESSMENT — PATIENT HEALTH QUESTIONNAIRE - PHQ9
SUM OF ALL RESPONSES TO PHQ QUESTIONS 1-9: 0
SUM OF ALL RESPONSES TO PHQ QUESTIONS 1-9: 0
SUM OF ALL RESPONSES TO PHQ9 QUESTIONS 1 & 2: 0
1. LITTLE INTEREST OR PLEASURE IN DOING THINGS: 0
SUM OF ALL RESPONSES TO PHQ QUESTIONS 1-9: 0
SUM OF ALL RESPONSES TO PHQ QUESTIONS 1-9: 0

## 2024-01-25 ASSESSMENT — ENCOUNTER SYMPTOMS
EYE PAIN: 0
SORE THROAT: 1
WHEEZING: 0
SINUS PAIN: 1
SINUS PRESSURE: 1
SHORTNESS OF BREATH: 0
CHEST TIGHTNESS: 0

## 2024-01-25 NOTE — ASSESSMENT & PLAN NOTE
-normal brain MRI 01/2024  -relief with maxalt for abortive therapy  -decreased frequency of migraines since starting amitriptyline  -continue maxalt for abortive therapy

## 2024-02-13 DIAGNOSIS — D72.829 LEUKOCYTOSIS, UNSPECIFIED TYPE: ICD-10-CM

## 2024-02-13 LAB
BASOPHILS # BLD: 0 K/UL (ref 0–0.2)
BASOPHILS NFR BLD: 0 % (ref 0–2)
DIFFERENTIAL METHOD BLD: ABNORMAL
EOSINOPHIL # BLD: 0.3 K/UL (ref 0–0.8)
EOSINOPHIL NFR BLD: 4 % (ref 0.5–7.8)
ERYTHROCYTE [DISTWIDTH] IN BLOOD BY AUTOMATED COUNT: 12.5 % (ref 11.9–14.6)
HCT VFR BLD AUTO: 39.6 % (ref 35.8–46.3)
HGB BLD-MCNC: 13.2 G/DL (ref 11.7–15.4)
IMM GRANULOCYTES # BLD AUTO: 0 K/UL (ref 0–0.5)
IMM GRANULOCYTES NFR BLD AUTO: 0 % (ref 0–5)
LYMPHOCYTES # BLD: 1.8 K/UL (ref 0.5–4.6)
LYMPHOCYTES NFR BLD: 32 % (ref 13–44)
MCH RBC QN AUTO: 30.4 PG (ref 26.1–32.9)
MCHC RBC AUTO-ENTMCNC: 33.3 G/DL (ref 31.4–35)
MCV RBC AUTO: 91.2 FL (ref 82–102)
MONOCYTES # BLD: 0.5 K/UL (ref 0.1–1.3)
MONOCYTES NFR BLD: 8 % (ref 4–12)
NEUTS SEG # BLD: 3.1 K/UL (ref 1.7–8.2)
NEUTS SEG NFR BLD: 56 % (ref 43–78)
NRBC # BLD: 0 K/UL (ref 0–0.2)
PLATELET # BLD AUTO: 325 K/UL (ref 150–450)
PMV BLD AUTO: 9.2 FL (ref 9.4–12.3)
RBC # BLD AUTO: 4.34 M/UL (ref 4.05–5.2)
WBC # BLD AUTO: 5.6 K/UL (ref 4.3–11.1)

## 2024-11-25 ENCOUNTER — OFFICE VISIT (OUTPATIENT)
Dept: FAMILY MEDICINE CLINIC | Facility: CLINIC | Age: 29
End: 2024-11-25
Payer: COMMERCIAL

## 2024-11-25 VITALS
SYSTOLIC BLOOD PRESSURE: 128 MMHG | HEART RATE: 52 BPM | DIASTOLIC BLOOD PRESSURE: 86 MMHG | OXYGEN SATURATION: 98 % | TEMPERATURE: 98.4 F

## 2024-11-25 DIAGNOSIS — J01.90 ACUTE NON-RECURRENT SINUSITIS, UNSPECIFIED LOCATION: Primary | ICD-10-CM

## 2024-11-25 DIAGNOSIS — J02.9 PHARYNGITIS, UNSPECIFIED ETIOLOGY: ICD-10-CM

## 2024-11-25 PROCEDURE — 99213 OFFICE O/P EST LOW 20 MIN: CPT | Performed by: NURSE PRACTITIONER

## 2024-11-25 RX ORDER — AMOXICILLIN 500 MG/1
500 TABLET, FILM COATED ORAL 2 TIMES DAILY
COMMUNITY
Start: 2024-11-18

## 2024-11-25 RX ORDER — PREDNISONE 20 MG/1
40 TABLET ORAL DAILY
Qty: 10 TABLET | Refills: 0 | Status: SHIPPED | OUTPATIENT
Start: 2024-11-25 | End: 2024-11-30

## 2024-11-25 ASSESSMENT — ENCOUNTER SYMPTOMS
COUGH: 0
NAUSEA: 1
SINUS PAIN: 1
SINUS PRESSURE: 1
SHORTNESS OF BREATH: 0
WHEEZING: 1
VOMITING: 0
RHINORRHEA: 1
SORE THROAT: 0

## 2024-11-25 NOTE — PROGRESS NOTES
Boogie Hidalgo is a 29 y.o. female who presents today for the following:  Chief Complaint   Patient presents with   • Congestion     Pt reports sore throat x 9 days, has subsided mostly, now severe congestion. Has been taking amoxicillin 500mg BID since Tuesday for dx of strep. On and off fever, last fever of 101F Friday       Allergies   Allergen Reactions   • Acetazolamide Rash   • Cherry Anaphylaxis     [place]    • Sulfa Antibiotics Anaphylaxis   • Nickel Rash       Current Outpatient Medications   Medication Sig Dispense Refill   • amoxicillin (AMOXIL) 500 MG tablet Take 1 tablet by mouth 2 times daily     • predniSONE (DELTASONE) 20 MG tablet Take 2 tablets by mouth daily for 5 days 10 tablet 0   • rizatriptan (MAXALT) 10 MG tablet Take 1 tablet by mouth daily as needed for Migraine May repeat in 2 hours if needed 12 tablet 3   • methylphenidate (CONCERTA) 18 MG extended release tablet Take 1 tablet by mouth every morning.     • promethazine (PHENERGAN) 25 MG tablet Take 1 tablet by mouth every 8 hours as needed for Nausea (and migraine) 30 tablet 0   • omeprazole (PRILOSEC) 40 MG delayed release capsule Take 1 capsule by mouth daily     • albuterol sulfate  (90 Base) MCG/ACT inhaler Inhale 2 puffs into the lungs every 4 hours as needed     • fexofenadine (ALLEGRA) 180 MG tablet Take 1 tablet by mouth daily     • fluconazole (DIFLUCAN) 150 MG tablet Take one tab PO today and one tab PO in one week. (Patient not taking: Reported on 11/25/2024) 2 tablet 0   • amitriptyline (ELAVIL) 10 MG tablet Take 1 tablet by mouth nightly (Patient not taking: Reported on 11/25/2024) 90 tablet 1   • clonazePAM (KLONOPIN) 0.5 MG tablet Take 1 tablet by mouth 2 times daily as needed for Anxiety. Uses at bedtime Max Daily Amount: 1 mg     • diclofenac (VOLTAREN) 50 MG EC tablet Take 1 tablet by mouth 2 times daily With food 20 tablet 0   • norelgestromin-ethinyl estradiol (XULANE) 150-35 MCG/24HR Place 1 patch

## 2025-01-15 ENCOUNTER — TELEPHONE (OUTPATIENT)
Dept: FAMILY MEDICINE CLINIC | Facility: CLINIC | Age: 30
End: 2025-01-15

## 2025-01-15 DIAGNOSIS — R05.1 ACUTE COUGH: Primary | ICD-10-CM

## 2025-01-15 NOTE — TELEPHONE ENCOUNTER
Patient called back, needs chest xray ordered. Once she gets the xray done she will call to make an appointment.

## 2025-01-16 ENCOUNTER — OFFICE VISIT (OUTPATIENT)
Dept: FAMILY MEDICINE CLINIC | Facility: CLINIC | Age: 30
End: 2025-01-16
Payer: COMMERCIAL

## 2025-01-16 VITALS
OXYGEN SATURATION: 99 % | HEIGHT: 70 IN | WEIGHT: 165 LBS | BODY MASS INDEX: 23.62 KG/M2 | DIASTOLIC BLOOD PRESSURE: 62 MMHG | HEART RATE: 62 BPM | TEMPERATURE: 97.9 F | SYSTOLIC BLOOD PRESSURE: 104 MMHG

## 2025-01-16 DIAGNOSIS — J01.90 ACUTE NON-RECURRENT SINUSITIS, UNSPECIFIED LOCATION: Primary | ICD-10-CM

## 2025-01-16 PROCEDURE — 99213 OFFICE O/P EST LOW 20 MIN: CPT | Performed by: FAMILY MEDICINE

## 2025-01-16 RX ORDER — FLUCONAZOLE 150 MG/1
TABLET ORAL
Qty: 2 TABLET | Refills: 0 | Status: SHIPPED | OUTPATIENT
Start: 2025-01-16

## 2025-01-16 SDOH — ECONOMIC STABILITY: FOOD INSECURITY: WITHIN THE PAST 12 MONTHS, YOU WORRIED THAT YOUR FOOD WOULD RUN OUT BEFORE YOU GOT MONEY TO BUY MORE.: NEVER TRUE

## 2025-01-16 SDOH — ECONOMIC STABILITY: FOOD INSECURITY: WITHIN THE PAST 12 MONTHS, THE FOOD YOU BOUGHT JUST DIDN'T LAST AND YOU DIDN'T HAVE MONEY TO GET MORE.: NEVER TRUE

## 2025-01-16 ASSESSMENT — ENCOUNTER SYMPTOMS
EYE PAIN: 0
CHEST TIGHTNESS: 0
SINUS PRESSURE: 1
WHEEZING: 0
COUGH: 1
SINUS PAIN: 1
SHORTNESS OF BREATH: 0
SORE THROAT: 1

## 2025-01-16 ASSESSMENT — PATIENT HEALTH QUESTIONNAIRE - PHQ9
SUM OF ALL RESPONSES TO PHQ9 QUESTIONS 1 & 2: 0
SUM OF ALL RESPONSES TO PHQ QUESTIONS 1-9: 0
SUM OF ALL RESPONSES TO PHQ QUESTIONS 1-9: 0
2. FEELING DOWN, DEPRESSED OR HOPELESS: NOT AT ALL
SUM OF ALL RESPONSES TO PHQ QUESTIONS 1-9: 0
1. LITTLE INTEREST OR PLEASURE IN DOING THINGS: NOT AT ALL
SUM OF ALL RESPONSES TO PHQ QUESTIONS 1-9: 0

## 2025-01-16 NOTE — PROGRESS NOTES
pain beneath her left breast on Tuesday night. Given her prolonged cough, she expressed concern about a potential pneumonia diagnosis. 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     She experienced an asthma attack on Monday, triggered by a coughing spell. Otherwise asthma has been well controlled.  Continues to use steroid inhaler.       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 She also works as a  and  1 or 2 nights a week.    ALLERGIES  The patient is allergic to SULFA DRUGS.           Review of Systems   Constitutional:  Negative for chills, fatigue and unexpected weight change.   HENT:  Positive for congestion, sinus pressure, sinus pain and sore throat.    Eyes:  Negative for pain.   Respiratory:  Positive for cough. Negative for chest tightness, shortness of breath and wheezing.         /62   Pulse 62   Temp 97.9 °F (36.6 °C) (Oral)   Ht 1.778 m (5' 10\")   Wt 74.8 kg (165 lb)   SpO2 99%   BMI 23.68 kg/m²       Physical Exam  Vitals reviewed.   Constitutional:       General: She is not in acute distress.     Appearance: Normal appearance. She is not ill-appearing.   HENT:      Ears:      Comments: Clear fluid behind TMs     Nose: Rhinorrhea present. No congestion.      Mouth/Throat:      Pharynx: Posterior oropharyngeal erythema present. No oropharyngeal exudate.   Eyes:      Conjunctiva/sclera: Conjunctivae normal.   Cardiovascular:      Rate and Rhythm: Normal rate and regular rhythm.      Heart sounds: Normal heart sounds. No murmur heard.  Pulmonary:      Effort: Pulmonary effort is normal. No respiratory distress.      Breath sounds: Normal breath sounds. No wheezing or rales.   Musculoskeletal:      Cervical back: Neck supple.      Right lower leg: No edema.      Left lower leg: No edema.   Neurological:      Mental Status: She is alert.        Xray Result

## 2025-02-13 ENCOUNTER — PATIENT MESSAGE (OUTPATIENT)
Dept: OBGYN CLINIC | Age: 30
End: 2025-02-13

## 2025-02-13 DIAGNOSIS — N92.6 IRREGULAR BLEEDING: ICD-10-CM

## 2025-02-13 RX ORDER — NORELGESTROMIN AND ETHINYL ESTRADIOL 35; 150 UG/MG; UG/MG
1 PATCH TRANSDERMAL WEEKLY
Qty: 9 PATCH | Refills: 4 | OUTPATIENT
Start: 2025-02-13

## 2025-02-14 ENCOUNTER — TELEMEDICINE (OUTPATIENT)
Dept: FAMILY MEDICINE CLINIC | Facility: CLINIC | Age: 30
End: 2025-02-14
Payer: COMMERCIAL

## 2025-02-14 DIAGNOSIS — U07.1 COVID-19: Primary | ICD-10-CM

## 2025-02-14 PROCEDURE — 99213 OFFICE O/P EST LOW 20 MIN: CPT | Performed by: PHYSICIAN ASSISTANT

## 2025-02-14 RX ORDER — SODIUM CHLORIDE/SODIUM BICARB 2.7 %
2 AEROSOL, SPRAY (GRAM) NASAL 4 TIMES DAILY PRN
Qty: 1 EACH | Refills: 1 | Status: SHIPPED | OUTPATIENT
Start: 2025-02-14

## 2025-02-14 RX ORDER — GUAIFENESIN 600 MG/1
1200 TABLET, EXTENDED RELEASE ORAL 2 TIMES DAILY PRN
Qty: 30 TABLET | Refills: 0 | Status: SHIPPED | OUTPATIENT
Start: 2025-02-14 | End: 2025-02-24

## 2025-02-14 RX ORDER — FLUTICASONE PROPIONATE 50 MCG
2 SPRAY, SUSPENSION (ML) NASAL DAILY PRN
Qty: 16 G | Refills: 0 | Status: SHIPPED | OUTPATIENT
Start: 2025-02-14

## 2025-02-14 NOTE — PROGRESS NOTES
Exam     Constitutional: [x] Appears well-developed and well-nourished [x] No apparent distress      [] Abnormal -     Mental status: [x] Alert and awake  [x] Oriented to person/place/time [x] Able to follow commands    [] Abnormal -     HENT: [x] Normocephalic, atraumatic       Pulmonary/Chest: [x] Respiratory effort normal   [x] No visualized signs of difficulty breathing or respiratory distress        [] Abnormal -      Neurological:        [x] No Facial Asymmetry (Cranial nerve 7 motor function) (limited exam due to video visit)           Psychiatric:       [x] Normal Affect   Other pertinent observable physical exam findings:-         Below is the assessment and plan developed based on review of pertinent history, physical exam, labs, studies, and medications.      Assessment and Plan:  1. COVID-19  -     guaiFENesin (MUCINEX) 600 MG extended release tablet; Take 2 tablets by mouth 2 times daily as needed for Congestion, Disp-30 tablet, R-0Normal  -     Sodium Chloride-Sodium Bicarb (NASAMIST HYPERTONIC) AERS; 2 sprays by Nasal route 4 times daily as needed (nasal congestion), Disp-1 each, R-1Normal  -     fluticasone (FLONASE) 50 MCG/ACT nasal spray; 2 sprays by Each Nostril route daily as needed for Rhinitis Until congestion resolves., Disp-16 g, R-0Normal  Pt is not a candidate for Paxlovid  Acetaminophen PRN body aches  Mucinex PRN congestion  Anti-histamine PRN congestion (pt has zyrtec at home)  Saline nasal spray and flonase PRN nasal congestion. May also use OTC sinus rinse   Albuterol HFA PRN wheezing (pt has at home)  Supportive care including rest and hydration  Pt is aware that she may come to clinic for full exam including ear exam   Pt advised to remain home from work, school, and other public places until at least 24 hours after defervescence (without use of antipyretics)   Pt advised to return to clinic or seek medical care elsewhere if symptoms worsen, change, or fail to improve.       Return

## 2025-02-17 RX ORDER — NORELGESTROMIN AND ETHINYL ESTRADIOL 35; 150 UG/MG; UG/MG
1 PATCH TRANSDERMAL WEEKLY
Qty: 9 PATCH | Refills: 4 | OUTPATIENT
Start: 2025-02-17

## 2025-02-18 RX ORDER — NORELGESTROMIN AND ETHINYL ESTRADIOL 35; 150 UG/MG; UG/MG
1 PATCH TRANSDERMAL WEEKLY
Qty: 3 PATCH | Refills: 0 | Status: SHIPPED | OUTPATIENT
Start: 2025-02-18

## 2025-02-20 NOTE — PROGRESS NOTES
HPI    Boogie Hidalgo is a 29 y.o. female seen for annual GYN exam.  She is getting her master's degree in psychology at [employer] and wants to go to medical school in [place].  The patch is working well for her    Past Medical History, Past Surgical History, Family history, Social History, and Medications were all reviewed with the patient today and updated as necessary.     Current Outpatient Medications   Medication Sig    norelgestromin-ethinyl estradiol (XULANE) 150-35 MCG/24HR Place 1 patch onto the skin once a week For 3 weeks then off for 1 week.    guaiFENesin (MUCINEX) 600 MG extended release tablet Take 2 tablets by mouth 2 times daily as needed for Congestion    fluticasone (FLONASE) 50 MCG/ACT nasal spray 2 sprays by Each Nostril route daily as needed for Rhinitis Until congestion resolves.    rizatriptan (MAXALT) 10 MG tablet Take 1 tablet by mouth daily as needed for Migraine May repeat in 2 hours if needed    methylphenidate (CONCERTA) 18 MG extended release tablet Take 1 tablet by mouth every morning.    promethazine (PHENERGAN) 25 MG tablet Take 1 tablet by mouth every 8 hours as needed for Nausea (and migraine)    albuterol sulfate  (90 Base) MCG/ACT inhaler Inhale 2 puffs into the lungs every 4 hours as needed    fexofenadine (ALLEGRA) 180 MG tablet Take 1 tablet by mouth daily    budesonide (PULMICORT) 0.5 MG/2ML nebulizer suspension Indications: ok to substitue. to include rinse bottle. Add 10mL of medication to 240mL of saline. Irrigate 2x daily. Budesonide 0.6mg/10 mL (Patient not taking: Reported on 11/25/2024)    EPINEPHrine (EPIPEN) 0.3 MG/0.3ML SOAJ injection INJECT INTO OUTER THIGH FOR SEVERE ALLERGIC REACTION. CALL 911 AFTER USE. (Patient not taking: Reported on 2/24/2025)     No current facility-administered medications for this visit.     Allergies   Allergen Reactions    Acetazolamide Rash    Cherry Anaphylaxis     Cherry     Sulfa Antibiotics Anaphylaxis

## 2025-02-24 ENCOUNTER — OFFICE VISIT (OUTPATIENT)
Dept: OBGYN CLINIC | Age: 30
End: 2025-02-24
Payer: COMMERCIAL

## 2025-02-24 VITALS
DIASTOLIC BLOOD PRESSURE: 78 MMHG | BODY MASS INDEX: 23.19 KG/M2 | SYSTOLIC BLOOD PRESSURE: 110 MMHG | HEIGHT: 70 IN | WEIGHT: 162 LBS

## 2025-02-24 DIAGNOSIS — Z12.4 ROUTINE CERVICAL SMEAR: ICD-10-CM

## 2025-02-24 DIAGNOSIS — N92.6 IRREGULAR BLEEDING: ICD-10-CM

## 2025-02-24 DIAGNOSIS — Z01.419 ENCOUNTER FOR WELL WOMAN EXAM WITH ROUTINE GYNECOLOGICAL EXAM: Primary | ICD-10-CM

## 2025-02-24 PROCEDURE — 99459 PELVIC EXAMINATION: CPT | Performed by: OBSTETRICS & GYNECOLOGY

## 2025-02-24 PROCEDURE — 99395 PREV VISIT EST AGE 18-39: CPT | Performed by: OBSTETRICS & GYNECOLOGY

## 2025-02-24 RX ORDER — NORELGESTROMIN AND ETHINYL ESTRADIOL 35; 150 UG/MG; UG/MG
1 PATCH TRANSDERMAL WEEKLY
Qty: 9 PATCH | Refills: 4 | Status: SHIPPED | OUTPATIENT
Start: 2025-02-24

## 2025-02-28 LAB
COLLECTION METHOD: NORMAL
CYTOLOGIST CVX/VAG CYTO: NORMAL
CYTOLOGY CVX/VAG DOC THIN PREP: NORMAL
DATE OF LMP: NORMAL
HPV REFLEX: NORMAL
Lab: NORMAL
Lab: NORMAL
OTHER PT INFO: NORMAL
PAP SOURCE: NORMAL
PATH REPORT.FINAL DX SPEC: NORMAL
PREV TREATMENT: NORMAL
STAT OF ADQ CVX/VAG CYTO-IMP: NORMAL

## 2025-03-14 DIAGNOSIS — U07.1 COVID-19: ICD-10-CM

## 2025-03-14 RX ORDER — FLUTICASONE PROPIONATE 50 MCG
SPRAY, SUSPENSION (ML) NASAL
Refills: 1 | OUTPATIENT
Start: 2025-03-14

## 2025-03-16 DIAGNOSIS — N92.6 IRREGULAR BLEEDING: ICD-10-CM

## 2025-03-17 RX ORDER — NORELGESTROMIN AND ETHINYL ESTRADIOL 35; 150 UG/MG; UG/MG
1 PATCH TRANSDERMAL WEEKLY
Qty: 3 PATCH | OUTPATIENT
Start: 2025-03-17

## 2025-03-19 ENCOUNTER — PATIENT MESSAGE (OUTPATIENT)
Dept: FAMILY MEDICINE CLINIC | Facility: CLINIC | Age: 30
End: 2025-03-19

## 2025-03-19 DIAGNOSIS — G43.109 MIGRAINE WITH AURA AND WITHOUT STATUS MIGRAINOSUS, NOT INTRACTABLE: ICD-10-CM

## 2025-03-19 RX ORDER — RIZATRIPTAN BENZOATE 10 MG/1
10 TABLET ORAL DAILY PRN
Qty: 12 TABLET | Refills: 3 | Status: SHIPPED | OUTPATIENT
Start: 2025-03-19

## 2025-03-19 RX ORDER — RIZATRIPTAN BENZOATE 10 MG/1
TABLET ORAL
Qty: 12 TABLET | Refills: 3 | OUTPATIENT
Start: 2025-03-19

## 2025-04-30 ENCOUNTER — OFFICE VISIT (OUTPATIENT)
Dept: FAMILY MEDICINE CLINIC | Facility: CLINIC | Age: 30
End: 2025-04-30
Payer: COMMERCIAL

## 2025-04-30 VITALS
DIASTOLIC BLOOD PRESSURE: 70 MMHG | SYSTOLIC BLOOD PRESSURE: 126 MMHG | HEIGHT: 70 IN | BODY MASS INDEX: 22.9 KG/M2 | TEMPERATURE: 98.2 F | WEIGHT: 160 LBS | OXYGEN SATURATION: 100 % | RESPIRATION RATE: 14 BRPM | HEART RATE: 64 BPM

## 2025-04-30 DIAGNOSIS — J32.9 RECURRENT SINUSITIS: ICD-10-CM

## 2025-04-30 DIAGNOSIS — J34.89 RHINORRHEA: ICD-10-CM

## 2025-04-30 DIAGNOSIS — G43.109 MIGRAINE WITH AURA AND WITHOUT STATUS MIGRAINOSUS, NOT INTRACTABLE: Primary | ICD-10-CM

## 2025-04-30 PROCEDURE — 99214 OFFICE O/P EST MOD 30 MIN: CPT | Performed by: FAMILY MEDICINE

## 2025-04-30 RX ORDER — RIMEGEPANT SULFATE 75 MG/75MG
75 TABLET, ORALLY DISINTEGRATING ORAL DAILY PRN
Qty: 16 TABLET | Refills: 3 | Status: SHIPPED | OUTPATIENT
Start: 2025-04-30

## 2025-04-30 NOTE — PROGRESS NOTES
Boogie Hidalgo is a 29 y.o. female who presents today for the following:  Chief Complaint   Patient presents with    Other     30-40 mL clear fluid drained from nose on more than one occasion since Thursday, last week. Fluid draining from ears, as well. Migraine prior.       Allergies   Allergen Reactions    Acetazolamide Rash    Cherry Anaphylaxis     Cherry     Sulfa Antibiotics Anaphylaxis    Nickel Rash       Current Outpatient Medications   Medication Sig Dispense Refill    rizatriptan (MAXALT) 10 MG tablet Take 1 tablet by mouth daily as needed for Migraine May repeat in 2 hours if needed 12 tablet 3    norelgestromin-ethinyl estradiol (XULANE) 150-35 MCG/24HR Place 1 patch onto the skin once a week For 3 weeks then off for 1 week. 9 patch 4    fluticasone (FLONASE) 50 MCG/ACT nasal spray 2 sprays by Each Nostril route daily as needed for Rhinitis Until congestion resolves. 16 g 0    methylphenidate (CONCERTA) 18 MG extended release tablet Take 1 tablet by mouth every morning.      promethazine (PHENERGAN) 25 MG tablet Take 1 tablet by mouth every 8 hours as needed for Nausea (and migraine) 30 tablet 0    budesonide (PULMICORT) 0.5 MG/2ML nebulizer suspension Indications: ok to substitue. to include rinse bottle. Add 10mL of medication to 240mL of saline. Irrigate 2x daily. Budesonide 0.6mg/10 mL (Patient not taking: Reported on 11/25/2024)      albuterol sulfate  (90 Base) MCG/ACT inhaler Inhale 2 puffs into the lungs every 4 hours as needed      EPINEPHrine (EPIPEN) 0.3 MG/0.3ML SOAJ injection INJECT INTO OUTER THIGH FOR SEVERE ALLERGIC REACTION. CALL 911 AFTER USE. (Patient not taking: Reported on 2/24/2025)      fexofenadine (ALLEGRA) 180 MG tablet Take 1 tablet by mouth daily       No current facility-administered medications for this visit.       Past Medical History:   Diagnosis Date    ADHD (attention deficit hyperactivity disorder)     Allergy     Asthma     DVT of axillary vein, acute

## 2025-05-01 ASSESSMENT — ENCOUNTER SYMPTOMS
RHINORRHEA: 1
NAUSEA: 1

## 2025-05-13 ENCOUNTER — HOSPITAL ENCOUNTER (OUTPATIENT)
Dept: MRI IMAGING | Age: 30
Discharge: HOME | End: 2025-05-16
Attending: FAMILY MEDICINE
Payer: COMMERCIAL

## 2025-05-13 DIAGNOSIS — G43.109 MIGRAINE WITH AURA AND WITHOUT STATUS MIGRAINOSUS, NOT INTRACTABLE: ICD-10-CM

## 2025-05-13 DIAGNOSIS — J34.89 RHINORRHEA: ICD-10-CM

## 2025-05-13 PROCEDURE — 70553 MRI BRAIN STEM W/O & W/DYE: CPT

## 2025-05-13 PROCEDURE — 2500000003 HC RX 250 WO HCPCS: Performed by: FAMILY MEDICINE

## 2025-05-13 PROCEDURE — A9579 GAD-BASE MR CONTRAST NOS,1ML: HCPCS | Performed by: FAMILY MEDICINE

## 2025-05-13 PROCEDURE — 6360000004 HC RX CONTRAST MEDICATION: Performed by: FAMILY MEDICINE

## 2025-05-13 RX ORDER — SODIUM CHLORIDE 0.9 % (FLUSH) 0.9 %
10 SYRINGE (ML) INJECTION AS NEEDED
Status: DISCONTINUED | OUTPATIENT
Start: 2025-05-13 | End: 2025-05-17 | Stop reason: HOSPADM

## 2025-05-13 RX ADMIN — GADOTERIDOL 15 ML: 279.3 INJECTION, SOLUTION INTRAVENOUS at 22:06

## 2025-05-13 RX ADMIN — SODIUM CHLORIDE, PRESERVATIVE FREE 10 ML: 5 INJECTION INTRAVENOUS at 22:06

## 2025-05-14 ENCOUNTER — RESULTS FOLLOW-UP (OUTPATIENT)
Dept: FAMILY MEDICINE CLINIC | Facility: CLINIC | Age: 30
End: 2025-05-14

## 2025-05-29 ENCOUNTER — OFFICE VISIT (OUTPATIENT)
Dept: ENT CLINIC | Age: 30
End: 2025-05-29
Payer: COMMERCIAL

## 2025-05-29 VITALS — WEIGHT: 160 LBS | BODY MASS INDEX: 22.9 KG/M2 | RESPIRATION RATE: 16 BRPM | HEIGHT: 70 IN

## 2025-05-29 DIAGNOSIS — J30.0 VASOMOTOR RHINITIS: ICD-10-CM

## 2025-05-29 DIAGNOSIS — J34.2 NASAL SEPTAL DEVIATION: ICD-10-CM

## 2025-05-29 DIAGNOSIS — J34.829 NASAL VALVE COLLAPSE: ICD-10-CM

## 2025-05-29 DIAGNOSIS — J32.4 CHRONIC PANSINUSITIS: Primary | ICD-10-CM

## 2025-05-29 PROCEDURE — 31231 NASAL ENDOSCOPY DX: CPT | Performed by: STUDENT IN AN ORGANIZED HEALTH CARE EDUCATION/TRAINING PROGRAM

## 2025-05-29 PROCEDURE — 99244 OFF/OP CNSLTJ NEW/EST MOD 40: CPT | Performed by: STUDENT IN AN ORGANIZED HEALTH CARE EDUCATION/TRAINING PROGRAM

## 2025-05-29 RX ORDER — ONDANSETRON 4 MG/1
4 TABLET, ORALLY DISINTEGRATING ORAL 3 TIMES DAILY PRN
COMMUNITY
Start: 2025-04-03

## 2025-05-29 RX ORDER — IPRATROPIUM BROMIDE 42 UG/1
2 SPRAY, METERED NASAL 3 TIMES DAILY PRN
Qty: 15 ML | Refills: 3 | Status: SHIPPED | OUTPATIENT
Start: 2025-05-29

## 2025-05-29 ASSESSMENT — ENCOUNTER SYMPTOMS
CHOKING: 0
EYE PAIN: 0
EYE DISCHARGE: 0
SINUS PRESSURE: 0
STRIDOR: 0
DIARRHEA: 0
NAUSEA: 0
SHORTNESS OF BREATH: 0
SINUS PAIN: 0
EYE ITCHING: 0
WHEEZING: 0
COUGH: 0
APNEA: 0
FACIAL SWELLING: 0
CONSTIPATION: 0

## 2025-05-29 NOTE — PROGRESS NOTES
[facility] Office Note    Patient: Boogie Hidalgo  MRN: [id number]  : 1995  Gender:  female  Vital Signs: Resp 16   Ht 1.778 m (5' 10\")   Wt 72.6 kg (160 lb)   BMI 22.96 kg/m²   Date: 2025    CC: Sinusitis      HPI:  Boogie Hidalgo is a 29 y.o. female here for evaluation of sinusitis.  Notes from referring provider reviewed.  She uses Flonase.  She has a history of migraines and takes Nurtec and Maxalt.  She endorses rhinorrhea, right greater than left.  Typically clear.  She will occasionally have rhinorrhea and the amount of approximately 25 mL.  She endorses left-sided nasal congestion and obstruction, particularly when she is sick.  She is followed by allergy.  She had taken Dupixent in the past but is not currently on this medication.  She endorses 2 sinonasal surgeries in the past    Past Medical History, Past Surgical History, Family history, Social History, and Medications were all reviewed with the patient today and updated as necessary.     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    Migraine with aura and without status migrainosus, not intractable     Current Outpatient Medications   Medication Sig    ondansetron (ZOFRAN-ODT) 4 MG disintegrating tablet Place 1 tablet under the tongue 3 times daily as needed    ipratropium (ATROVENT) 0.06 % nasal spray 2 sprays by Each Nostril route 3 times daily as needed for Rhinitis    rimegepant sulfate (NURTEC) 75 MG TBDP Take 75 mg by mouth daily as needed (headache)    rizatriptan (MAXALT) 10 MG tablet Take 1 tablet by mouth daily as needed for Migraine May repeat in 2 hours if needed    norelgestromin-ethinyl estradiol (XULANE) 150-35 MCG/24HR Place 1 patch onto the skin once a week

## 2025-05-30 ENCOUNTER — PATIENT MESSAGE (OUTPATIENT)
Dept: OBGYN CLINIC | Age: 30
End: 2025-05-30

## 2025-05-30 DIAGNOSIS — B37.31 YEAST VAGINITIS: Primary | ICD-10-CM

## 2025-06-02 RX ORDER — FLUCONAZOLE 150 MG/1
TABLET ORAL
Qty: 2 TABLET | Refills: 0 | Status: SHIPPED | OUTPATIENT
Start: 2025-06-02

## (undated) DEVICE — 2000CC GUARDIAN II: Brand: GUARDIAN

## (undated) DEVICE — GYN LAPAROSCOPY: Brand: MEDLINE INDUSTRIES, INC.

## (undated) DEVICE — ADHESIVE SKIN CLSR 0.7ML TOP DERMBND ADV

## (undated) DEVICE — PAD,NON-ADHERENT,3X8,STERILE,LF,1/PK: Brand: MEDLINE

## (undated) DEVICE — JELLY LUBRICATING 10GM PREFIL SYR LUBE

## (undated) DEVICE — GLOVE SURG SZ 7 CRM LTX FREE POLYISOPRENE POLYMER BEAD ANTI

## (undated) DEVICE — INSUFFLATION NEEDLE TO ESTABLISH PNEUMOPERITONEUM.: Brand: INSUFFLATION NEEDLE

## (undated) DEVICE — TUBING INSUFFLATION SMK EVAC HI FLO SET PNEUMOCLEAR

## (undated) DEVICE — SUTURE VCRL SZ 2-0 L27IN ABSRB VLT L26MM UR-6 5/8 CIR J602H

## (undated) DEVICE — CONTAINER SPEC FRMLN 120ML --

## (undated) DEVICE — TOTAL 1-LAYER TRAY, LATEX FOLEY, 16FR 10: Brand: MEDLINE

## (undated) DEVICE — 2, DISPOSABLE SUCTION/IRRIGATOR WITHOUT DISPOSABLE TIP: Brand: STRYKEFLOW

## (undated) DEVICE — LAPAROSCOPIC TROCAR SLEEVE/SINGLE USE: Brand: KII® OPTICAL ACCESS SYSTEM

## (undated) DEVICE — 40585 XL ADVANCED TRENDELENBURG POSITIONING KIT: Brand: 40585 XL ADVANCED TRENDELENBURG POSITIONING KIT

## (undated) DEVICE — REM POLYHESIVE ADULT PATIENT RETURN ELECTRODE: Brand: VALLEYLAB

## (undated) DEVICE — GOWN,REINF,POLY,ECL,PP SLV,XL: Brand: MEDLINE

## (undated) DEVICE — [HIGH FLOW INSUFFLATOR,  DO NOT USE IF PACKAGE IS DAMAGED,  KEEP DRY,  KEEP AWAY FROM SUNLIGHT,  PROTECT FROM HEAT AND RADIOACTIVE SOURCES.]: Brand: PNEUMOSURE

## (undated) DEVICE — POWDER HEMOSTAT GEL 3.0GR -- SURGICEL

## (undated) DEVICE — PUMP SUC IRR TBNG L10FT W/ HNDPC ASSEMB STRYKEFLOW 2

## (undated) DEVICE — TRAY PREP DRY W/ PREM GLV 2 APPL 6 SPNG 2 UNDPD 1 OVERWRAP

## (undated) DEVICE — SUTURE COAT VCRL SZ 4-0 L18IN ABSRB UD L19MM PS-2 1/2 CIR J496G

## (undated) DEVICE — SUTURE VCRL SZ 3-0 L27IN ABSRB UD L19MM PS-2 3/8 CIR PRIM J427H

## (undated) DEVICE — APPLICATOR MEDICATED 26 CC SOLUTION HI LT ORNG CHLORAPREP

## (undated) DEVICE — CANISTER, RIGID, 2000CC: Brand: MEDLINE INDUSTRIES, INC.

## (undated) DEVICE — TROCAR: Brand: KII® SLEEVE

## (undated) DEVICE — KIT,ANTI FOG,W/SPONGE & FLUID,SOFT PACK: Brand: MEDLINE

## (undated) DEVICE — PAD PT POS 36 IN SURGYPAD DISP

## (undated) DEVICE — SURGICEL ENDOSCP APPL

## (undated) DEVICE — GARMENT,MEDLINE,DVT,INT,CALF,MED, GEN2: Brand: MEDLINE

## (undated) DEVICE — CARDINAL HEALTH FLEXIBLE LIGHT HANDLE COVER: Brand: CARDINAL HEALTH

## (undated) DEVICE — SOLUTION IRRIG 1000ML 0.9% SOD CHL USP POUR PLAS BTL

## (undated) DEVICE — TROCAR: Brand: KII FIOS FIRST ENTRY

## (undated) DEVICE — (D)PREP SKN CHLRAPRP APPL 26ML -- CONVERT TO ITEM 371833

## (undated) DEVICE — SOLUTION IRRIG 3000ML 0.9% SOD CHL FLX CONT 0797208] ICU MEDICAL INC]

## (undated) DEVICE — INTENDED FOR TISSUE SEPARATION, AND OTHER PROCEDURES THAT REQUIRE A SHARP SURGICAL BLADE TO PUNCTURE OR CUT.: Brand: BARD-PARKER ® STAINLESS STEEL BLADES

## (undated) DEVICE — INJECTOR UTERINE MANIPULATOR

## (undated) DEVICE — PAD MATERNITY 11IN W/TAILS -- STRL

## (undated) DEVICE — SEALER TISS L37CM SHFT DIA5MM 360DEG ROT CVD JAW TAPR TIP

## (undated) DEVICE — DERMABOND SKIN ADH 0.7ML -- DERMABOND ADVANCED 12/BX

## (undated) DEVICE — SOLUTION IV 1000ML 0.9% SOD CHL